# Patient Record
Sex: MALE | Race: WHITE | NOT HISPANIC OR LATINO | Employment: UNEMPLOYED | URBAN - METROPOLITAN AREA
[De-identification: names, ages, dates, MRNs, and addresses within clinical notes are randomized per-mention and may not be internally consistent; named-entity substitution may affect disease eponyms.]

---

## 2017-01-13 ENCOUNTER — OFFICE VISIT (OUTPATIENT)
Dept: NEUROSURGERY | Facility: CLINIC | Age: 47
End: 2017-01-13

## 2017-01-13 VITALS
HEART RATE: 91 BPM | DIASTOLIC BLOOD PRESSURE: 58 MMHG | SYSTOLIC BLOOD PRESSURE: 90 MMHG | HEIGHT: 70 IN | OXYGEN SATURATION: 98 % | TEMPERATURE: 98.7 F | WEIGHT: 147 LBS | BODY MASS INDEX: 21.05 KG/M2

## 2017-01-13 DIAGNOSIS — G89.29 CHRONIC NECK PAIN: ICD-10-CM

## 2017-01-13 DIAGNOSIS — M54.2 CHRONIC NECK PAIN: ICD-10-CM

## 2017-01-13 DIAGNOSIS — G89.29 CHRONIC MIDLINE LOW BACK PAIN WITHOUT SCIATICA: ICD-10-CM

## 2017-01-13 DIAGNOSIS — M47.812 CERVICAL SPONDYLOSIS WITHOUT MYELOPATHY: Primary | ICD-10-CM

## 2017-01-13 DIAGNOSIS — M54.50 CHRONIC MIDLINE LOW BACK PAIN WITHOUT SCIATICA: ICD-10-CM

## 2017-01-13 DIAGNOSIS — Z98.1 S/P CERVICAL SPINAL FUSION: ICD-10-CM

## 2017-01-13 PROCEDURE — 99213 OFFICE O/P EST LOW 20 MIN: CPT | Performed by: NEUROLOGICAL SURGERY

## 2017-01-13 RX ORDER — OXYCODONE HYDROCHLORIDE 20 MG/1
TABLET ORAL
COMMUNITY
Start: 2017-01-10 | End: 2022-03-16

## 2017-01-13 RX ORDER — DOCUSATE SODIUM -SENNOSIDES 50; 8.6 MG/1; MG/1
TABLET, COATED ORAL
COMMUNITY
Start: 2017-01-03 | End: 2022-03-16

## 2017-01-13 RX ORDER — METHOCARBAMOL 750 MG/1
TABLET, FILM COATED ORAL
COMMUNITY
Start: 2017-01-12 | End: 2022-03-16

## 2017-01-13 NOTE — PROGRESS NOTES
Patient: aVdim Grant  :  1970  Chart #:  [id number]    Date of Service: 17    Chief Complaint:   Chief Complaint   Patient presents with   • Neck Pain   • Back Pain       Back Pain   Chronicity: Patient returns today for a follow-up on his back pain. The current episode started more than 1 year ago (He has had low back pain for over 10 years now.). The problem occurs constantly. The problem is unchanged. The pain is present in the lumbar spine. Quality: They removed his pain pump 16 because of MRSA. Radiates to: He has pain that radiates down his left lower extremity to the knee. The pain is at a severity of 4/10. The pain is mild. The pain is worse during the day. The symptoms are aggravated by position. Stiffness is present: He has a pic-line placed to take his anti-biotics at home.  Risk factors include sedentary lifestyle (He has been in the hospital twice with MRSA.  On his second admission, they removed the pain pump.). He has tried bed rest and home exercises (He has been going to PT and OT.  Dr. Mishra at [facility] is the physical who put in the pain pump.) for the symptoms. The treatment provided moderate relief.     He had an intrathecal pain pump placed and it got infected with MRSA.  The pump was removed and he is on IV antibiotics;  He continues neck and low back pain;  He is walking better using a walker for balance.      Radiographic Images:   X-rays of the cervical spine dated 16 shows no offset of the vertebra.  The discs above the fusion looks good.  There is no instability;  Mild arthritic changes;  He has had C5C6 and  C6C7 ACDFs with apparent solid fusion.  X-rays of the lumbar spine shows a broken R iliac screw and L fusion cj fracture above the L5 pedicle screw.  Alignment is normal.    Past Medical History   Diagnosis Date   • Arthritis    • Hypertension    • Pneumonia      Current Outpatient Prescriptions   Medication Sig Dispense Refill   • aspirin 81 MG  EC tablet Take 81 mg by mouth daily.     • doxepin (SINEquan) 25 MG capsule Take 25 mg by mouth every night.     • famotidine (PEPCID) 20 MG tablet Take 20 mg by mouth at night as needed for heartburn.     • losartan (COZAAR) 50 MG tablet Take 100 mg by mouth daily.     • methocarbamol (ROBAXIN) 750 MG tablet      • Multiple Vitamin (MULTI-DAY VITAMINS) tablet Take  by mouth.     • naproxen sodium (ALEVE) 220 MG tablet Take 220 mg by mouth 2 (two) times a day as needed for mild pain (1-3).     • oxyCODONE (ROXICODONE) 20 MG tablet      • oxyCODONE-acetaminophen (PERCOCET) 5-325 MG per tablet Take 1 tablet by mouth every 8 (eight) hours as needed.     • SENEXON-S 8.6-50 MG per tablet        No current facility-administered medications for this visit.      Social History     Social History   • Marital status: Single     Spouse name: N/A   • Number of children: N/A   • Years of education: N/A     Social History Main Topics   • Smoking status: Current Every Day Smoker     Packs/day: 1.00     Types: Cigarettes   • Smokeless tobacco: None   • Alcohol use Yes      Comment: Moderate   • Drug use: No   • Sexual activity: Defer     Other Topics Concern   • None     Social History Narrative     Family History   Problem Relation Age of Onset   • Heart disease Father    • Cancer Brother      Past Surgical History   Procedure Laterality Date   • Neck surgery  2011   • Thoracic fusion  10/21/2014     T-10-S1 fusion by Dr Rothman   • Neck surgery  2012     [facility]   • Back surgery  10/21/2014     Dr. Rothman     Review of Systems   Constitutional: Positive for activity change, appetite change, fatigue and unexpected weight change.   Gastrointestinal: Positive for constipation.   Endocrine: Positive for polyphagia.   Musculoskeletal: Positive for back pain, gait problem and myalgias.   All other systems reviewed and are negative.    Vitals:    01/13/17 1100   BP: 90/58   Pulse: 91   Temp: 98.7 °F (37.1 °C)   SpO2: 98%      Physical Exam  Neurologic Exam  Constitutional: He is oriented to person, place, and time. Vital signs are normal. He appears well-developed and well-nourished. No distress.   Neat healthy male   Neck: Trachea normal and normal range of motion. No thyroid mass present.   Healed anterior neck incision; No Spurling's or L'Hermittes signs.   Musculoskeletal:   Lumbar back: He exhibits decreased range of motion and pain. He exhibits no deformity and no spasm.   Moderate stiffness. Healed lumbar incision.  Flexed at waist but can straighten to upright;  Using walker      Mental Status   Oriented to person, place, and time.   Attention: normal. Concentration: normal.   Speech: speech is normal   Level of consciousness: alert  Knowledge: good and consistent with education.   Normal comprehension.      Cranial Nerves   Cranial nerves II through XII intact.      Motor Exam   Muscle bulk: normal  Overall muscle tone: normal     Strength   Strength 5/5 throughout.      Sensory Exam   Light touch normal.   Proprioception normal.      Gait, Coordination, and Reflexes      Gait  Gait: (flexed at waist with cane)     Tremor   Resting tremor: absent  Intention tremor: absent  Action tremor: absent     Reflexes   Right biceps: 1+  Left biceps: 1+  Right triceps: 1+  Left triceps: 1+  Right patellar: 1+  Left patellar: 1+  Right achilles: 0  Left achilles: 0  Right Shahid: absent  Left Shahid: absent  Right ankle clonus: absent  Left ankle clonus: absent  CASTRO normal      Vadim was seen today for neck pain and back pain.    Diagnoses and all orders for this visit:    Cervical spondylosis without myelopathy    Chronic neck pain    S/P cervical spinal fusion    Chronic midline low back pain without sciatica        Plan: complete antibiotic treatment then f/u here in 6 weeks;  I don not recommend any surgery at this time.  I will make further recommendations at f/u.    Luis Fernando Rothman MD

## 2017-01-13 NOTE — MR AVS SNAPSHOT
Vadim Grant   1/13/2017 11:00 AM   Office Visit    Dept Phone:  [phone]   Encounter #:  [id number]    Provider:  Luis Fernando Rothman MD   Department:  [facility]                Your Full Care Plan              Your Updated Medication List          This list is accurate as of: 1/13/17 11:23 AM.  Always use your most recent med list.                aspirin 81 MG EC tablet       doxepin 25 MG capsule   Commonly known as:  SINEquan       famotidine 20 MG tablet   Commonly known as:  PEPCID       losartan 50 MG tablet   Commonly known as:  COZAAR       methocarbamol 750 MG tablet   Commonly known as:  ROBAXIN       MULTI-DAY VITAMINS tablet       naproxen sodium 220 MG tablet   Commonly known as:  ALEVE       oxyCODONE 20 MG tablet   Commonly known as:  ROXICODONE       oxyCODONE-acetaminophen 5-325 MG per tablet   Commonly known as:  PERCOCET       SENEXON-S 8.6-50 MG per tablet   Generic drug:  senna-docusate               You Were Diagnosed With        Codes Comments    Cervical spondylosis without myelopathy    -  Primary ICD-10-CM: M47.812  ICD-9-CM: 721.0     Chronic neck pain     ICD-10-CM: M54.2, G89.29  ICD-9-CM: 723.1, 338.29     S/P cervical spinal fusion     ICD-10-CM: Z98.1  ICD-9-CM: V45.4       Instructions     None    Patient Instructions History      Upcoming Appointments     Visit Type Date Time Department    OFFICE VISIT 1/13/2017 11:00 AM [facility]    OFFICE VISIT 2/24/2017 10:20 AM [facility]      MyChart Signup     Our records indicate that you have declined [facility] MyChart signup. If you would like to sign up for MyChart, please email [email or web address] or call [phone] to obtain an activation code.             Other Info from Your Visit           Your Appointments     Feb 24, 2017 10:20 AM EST   Office Visit with Luis Fernando Rothman MD   [facility]  "(--)    [address],  [address] [address] [phone]           Arrive 15 minutes prior to appointment.              Allergies     Neurontin [Gabapentin]        Reason for Visit     Neck Pain     Back Pain           Vital Signs     Blood Pressure Pulse Temperature Height Weight Oxygen Saturation    90/58 (BP Location: Right arm, Patient Position: Sitting) 91 98.7 °F (37.1 °C) (Temporal Artery ) 70\" (177.8 cm) 147 lb (66.7 kg) 98%    Body Mass Index Smoking Status                21.09 kg/m2 Current Every Day Smoker          Problems and Diagnoses Noted     Degenerative arthritis of cervical spine    -  Primary    Chronic neck pain        Status post cervical spinal arthrodesis            "

## 2017-01-13 NOTE — LETTER
2017     Delmy Casas MD  [address]  [address]  [facility] [address]    Patient: Vadim Grant   YOB: 1970   Date of Visit: 2017       Dear Dr. Augusto MD:    Vadim Grant was in my office today. Below is a copy of my note.    If you have questions, please do not hesitate to call me. I look forward to following Vadim along with you.         Sincerely,        Luis Fernando Rothman MD        CC: No Recipients    Patient: Vadim Grant  :  1970  Chart #:  [id number]    Date of Service: 17    Chief Complaint:   Chief Complaint   Patient presents with   • Neck Pain   • Back Pain       Back Pain   Chronicity: Patient returns today for a follow-up on his back pain. The current episode started more than 1 year ago (He has had low back pain for over 10 years now.). The problem occurs constantly. The problem is unchanged. The pain is present in the lumbar spine. Quality: They removed his pain pump 16 because of MRSA. Radiates to: He has pain that radiates down his left lower extremity to the knee. The pain is at a severity of 4/10. The pain is mild. The pain is worse during the day. The symptoms are aggravated by position. Stiffness is present: He has a pic-line placed to take his anti-biotics at home.  Risk factors include sedentary lifestyle (He has been in the hospital twice with MRSA.  On his second admission, they removed the pain pump.). He has tried bed rest and home exercises (He has been going [facility] PT [facility] OT[facility] for the symptoms. The treatment provided moderate relief.     He had an intrathecal pain pump placed and it got infected with MRSA.  The pump was removed and he is on IV antibiotics;  He continues neck and low back pain;  He is walking better using a walker for balance.      Radiographic Images:   X-rays of the cervical spine dated 16 shows no offset of the vertebra.  The discs  above the fusion looks good.  There is no instability;  Mild arthritic changes;  He has had C5C6 and  C6C7 ACDFs with apparent solid fusion.  X-rays of the lumbar spine shows a broken R iliac screw and L fusion cj fracture above the L5 pedicle screw.  Alignment is normal.    Past Medical History   Diagnosis Date   • Arthritis    • Hypertension    • Pneumonia      Current Outpatient Prescriptions   Medication Sig Dispense Refill   • aspirin 81 MG EC tablet Take 81 mg by mouth daily.     • doxepin (SINEquan) 25 MG capsule Take 25 mg by mouth every night.     • famotidine (PEPCID) 20 MG tablet Take 20 mg by mouth at night as needed for heartburn.     • losartan (COZAAR) 50 MG tablet Take 100 mg by mouth daily.     • methocarbamol (ROBAXIN) 750 MG tablet      • Multiple Vitamin (MULTI-DAY VITAMINS) tablet Take  by mouth.     • naproxen sodium (ALEVE) 220 MG tablet Take 220 mg by mouth 2 (two) times a day as needed for mild pain (1-3).     • oxyCODONE (ROXICODONE) 20 MG tablet      • oxyCODONE-acetaminophen (PERCOCET) 5-325 MG per tablet Take 1 tablet by mouth every 8 (eight) hours as needed.     • SENEXON-S 8.6-50 MG per tablet        No current facility-administered medications for this visit.      Social History     Social History   • Marital status: Single     Spouse name: N/A   • Number of children: N/A   • Years of education: N/A     Social History Main Topics   • Smoking status: Current Every Day Smoker     Packs/day: 1.00     Types: Cigarettes   • Smokeless tobacco: None   • Alcohol use Yes      Comment: Moderate   • Drug use: No   • Sexual activity: Defer     Other Topics Concern   • None     Social History Narrative     Family History   Problem Relation Age of Onset   • Heart disease Father    • Cancer Brother      Past Surgical History   Procedure Laterality Date   • Neck surgery  2011   • Thoracic fusion  10/21/2014     T-10-S1 fusion by Dr Rothman   • Neck surgery  2012     [facility] Corder[facility]   • Back surgery   10/21/2014     Dr. Rothman     Review of Systems   Constitutional: Positive for activity change, appetite change, fatigue and unexpected weight change.   Gastrointestinal: Positive for constipation.   Endocrine: Positive for polyphagia.   Musculoskeletal: Positive for back pain, gait problem and myalgias.   All other systems reviewed and are negative.    Vitals:    01/13/17 1100   BP: 90/58   Pulse: 91   Temp: 98.7 °F (37.1 °C)   SpO2: 98%     Physical Exam  Neurologic Exam  Constitutional: He is oriented to person, place, and time. Vital signs are normal. He appears well-developed and well-nourished. No distress.   Neat healthy male   Neck: Trachea normal and normal range of motion. No thyroid mass present.   Healed anterior neck incision; No Spurling's or L'Hermittes signs.   Musculoskeletal:   Lumbar back: He exhibits decreased range of motion and pain. He exhibits no deformity and no spasm.   Moderate stiffness. Healed lumbar incision.  Flexed at waist but can straighten to upright;  Using walker      Mental Status   Oriented to person, place, and time.   Attention: normal. Concentration: normal.   Speech: speech is normal   Level of consciousness: alert  Knowledge: good and consistent with education.   Normal comprehension.      Cranial Nerves   Cranial nerves II through XII intact.      Motor Exam   Muscle bulk: normal  Overall muscle tone: normal     Strength   Strength 5/5 throughout.      Sensory Exam   Light touch normal.   Proprioception normal.      Gait, Coordination, and Reflexes      Gait  Gait: (flexed at waist with cane)     Tremor   Resting tremor: absent  Intention tremor: absent  Action tremor: absent     Reflexes   Right biceps: 1+  Left biceps: 1+  Right triceps: 1+  Left triceps: 1+  Right patellar: 1+  Left patellar: 1+  Right achilles: 0  Left achilles: 0  Right Shahid: absent  Left Shahid: absent  Right ankle clonus: absent  Left ankle clonus: absent  CASTRO normal      Vadim was seen today  for neck pain and back pain.    Diagnoses and all orders for this visit:    Cervical spondylosis without myelopathy    Chronic neck pain    S/P cervical spinal fusion    Chronic midline low back pain without sciatica        Plan: complete antibiotic treatment then f/u here in 6 weeks;  I don not recommend any surgery at this time.  I will make further recommendations at f/u.    Luis Fernando Rothman MD

## 2017-02-24 ENCOUNTER — OFFICE VISIT (OUTPATIENT)
Dept: NEUROSURGERY | Facility: CLINIC | Age: 47
End: 2017-02-24

## 2017-02-24 VITALS
SYSTOLIC BLOOD PRESSURE: 116 MMHG | HEIGHT: 69 IN | HEART RATE: 105 BPM | TEMPERATURE: 96.3 F | BODY MASS INDEX: 22.36 KG/M2 | WEIGHT: 151 LBS | DIASTOLIC BLOOD PRESSURE: 84 MMHG

## 2017-02-24 DIAGNOSIS — M54.2 CHRONIC NECK PAIN: ICD-10-CM

## 2017-02-24 DIAGNOSIS — M54.50 CHRONIC MIDLINE LOW BACK PAIN WITHOUT SCIATICA: ICD-10-CM

## 2017-02-24 DIAGNOSIS — G89.29 CHRONIC NECK PAIN: ICD-10-CM

## 2017-02-24 DIAGNOSIS — M47.812 CERVICAL SPONDYLOSIS WITHOUT MYELOPATHY: Primary | ICD-10-CM

## 2017-02-24 DIAGNOSIS — Z98.1 S/P CERVICAL SPINAL FUSION: ICD-10-CM

## 2017-02-24 DIAGNOSIS — G89.29 CHRONIC MIDLINE LOW BACK PAIN WITHOUT SCIATICA: ICD-10-CM

## 2017-02-24 PROCEDURE — 99213 OFFICE O/P EST LOW 20 MIN: CPT | Performed by: NEUROLOGICAL SURGERY

## 2017-02-24 NOTE — PROGRESS NOTES
Patient: Vadim Grant  :  1970  Chart #:  [id number]    Date of Service: 17    Chief Complaint:   Chief Complaint   Patient presents with   • Back Pain       Back Pain   This is a chronic (Patient returns today for a follow up on his neck and back pain.) problem. The current episode started more than 1 year ago (He had a L3-L4 L4_L5 Esqiuvel-Petersens osteotomy and a right L2-L3 transforaminal interbody fusion with capstone cage on 10-21-14.). The problem occurs constantly. The problem has been gradually improving (He states that he has had a spasm in his back for a while.) since onset. The pain is present in the lumbar spine. The quality of the pain is described as aching. Radiates to: He has a broken screw at the bottom of his cage. The pain is at a severity of 7/10. The pain is moderate. The pain is the same all the time. Exacerbated by: He states that no matter what he does he hurts.  Stiffness is present all day. Risk factors include sedentary lifestyle and lack of exercise (He states he has passed out before beause the pain is so great.  Patient does have a history of MRSA.). He has tried heat, ice, NSAIDs and bed rest (He is seeing a infectious disease Doctor soon in [place].) for the symptoms. The treatment provided mild relief.     He has completed antibiotic (Cubicin) treatment and is to see an ID physician soon;  His back pain is better;  There are no other new symptoms or complaints since visit on 17.    Radiographic Images:   X-rays of the lumbar spine shows a broken R iliac screw and L fusion cj fracture above the L5 pedicle screw. Alignment is normal.    Past Medical History   Diagnosis Date   • Arthritis    • Hypertension    • Pneumonia      Current Outpatient Prescriptions   Medication Sig Dispense Refill   • methocarbamol (ROBAXIN) 750 MG tablet      • Multiple Vitamin (MULTI-DAY VITAMINS) tablet Take  by mouth.     • naproxen sodium (ALEVE) 220 MG tablet Take 220 mg by  "mouth 2 (two) times a day as needed for mild pain (1-3).     • oxyCODONE (ROXICODONE) 20 MG tablet      • SENEXON-S 8.6-50 MG per tablet      • aspirin 81 MG EC tablet Take 81 mg by mouth daily.     • doxepin (SINEquan) 25 MG capsule Take 25 mg by mouth every night.     • famotidine (PEPCID) 20 MG tablet Take 20 mg by mouth at night as needed for heartburn.     • losartan (COZAAR) 50 MG tablet Take 100 mg by mouth daily.     • oxyCODONE-acetaminophen (PERCOCET) 5-325 MG per tablet Take 1 tablet by mouth every 8 (eight) hours as needed.       No current facility-administered medications for this visit.       Allergies   Allergen Reactions   • Meloxicam    • Neurontin [Gabapentin]      Social History     Social History   • Marital status: Single     Spouse name: N/A   • Number of children: N/A   • Years of education: N/A     Social History Main Topics   • Smoking status: Current Every Day Smoker     Packs/day: 1.00     Types: Cigarettes   • Smokeless tobacco: None   • Alcohol use Yes      Comment: Moderate   • Drug use: No   • Sexual activity: Defer     Other Topics Concern   • None     Social History Narrative     Family History   Problem Relation Age of Onset   • Heart disease Father    • Cancer Brother      Past Surgical History   Procedure Laterality Date   • Neck surgery  2011   • Thoracic fusion  10/21/2014     T-10-S1 fusion by Dr Rothman   • Neck surgery  2012     [facility]   • Back surgery  10/21/2014     Dr. Rothman     Review of Systems   Musculoskeletal: Positive for back pain.   All other systems reviewed and are negative.    Vitals:    02/24/17 1000   BP: 116/84   Pulse: 105   Temp: 96.3 °F (35.7 °C)   Weight: 151 lb (68.5 kg)   Height: 69\" (175.3 cm)     Physical Exam  Neurologic Exam  Constitutional: He is oriented to person, place, and time. Vital signs are normal. He appears well-developed and well-nourished. No distress.   Neat healthy male   Neck: Trachea normal and normal range of motion. No " thyroid mass present.   Healed anterior neck incision; No Spurling's or L'Hermittes signs.   Musculoskeletal:   Lumbar back: He exhibits decreased range of motion and pain. He exhibits no deformity and no spasm.   Moderate stiffness. Healed lumbar incision. Flexed at waist but can straighten to upright;   R flank incision healed;  No sign of infection.    Mental Status   Oriented to person, place, and time.   Attention: normal. Concentration: normal.   Speech: speech is normal   Level of consciousness: alert  Knowledge: good and consistent with education.   Normal comprehension.       Cranial Nerves   Cranial nerves II through XII intact.       Motor Exam   Muscle bulk: normal  Overall muscle tone: normal      Strength   Strength 5/5 throughout.       Sensory Exam   Light touch normal.   Proprioception normal.       Gait, Coordination, and Reflexes       Gait  Gait: (flexed at waist with cane)      Tremor   Resting tremor: absent  Intention tremor: absent  Action tremor: absent      Reflexes   Right biceps: 1+  Left biceps: 1+  Right triceps: 1+  Left triceps: 1+  Right patellar: 1+  Left patellar: 1+  Right achilles: 0  Left achilles: 0  Right Shahid: absent  Left Shahid: absent  Right ankle clonus: absent  Left ankle clonus: absent  CASTRO normal       Vadim was seen today for back pain.    Diagnoses and all orders for this visit:    Cervical spondylosis without myelopathy    Chronic neck pain    S/P cervical spinal fusion    Chronic midline low back pain without sciatica      Plan: return in 2 months for re-evaluation;  F/u with ID;  I don't recommend any surgery at this time.      I, Dr. Luis Fernando Rothman, personally performed the services described in the documentation as scribed in my presence, and the documentation is both accurate and complete.    Luis Fernando Rothman MD   Scribed for Luis Fernando Rothman MD by ISSA Meredith, CATHLEEN @  2/24/2017  10:34 AM

## 2017-03-17 ENCOUNTER — TELEPHONE (OUTPATIENT)
Dept: NEUROSURGERY | Facility: CLINIC | Age: 47
End: 2017-03-17

## 2017-03-17 NOTE — TELEPHONE ENCOUNTER
Patient's PCP calling to update Dr. Rothman on patient status. Several weeks ago she referred him to infectious disease at [facility] in [place].  She is going to fax over hospital records.  Patient has been discharged and was seen in her office yesterday.     We will look for records and update Dr. Rothman

## 2017-03-17 NOTE — TELEPHONE ENCOUNTER
Provider:  Stephan  Caller: Jun from [facility] - Dr. Delmy Casas   Time of call:   11:46  Phone #:  [phone]  Last visit:   02/24/17      Reason for call:       ----- Message from Liberty Mabry sent at 3/17/2017 11:46 AM EDT -----  Regarding: TREATMENT WITH INF DIS FOR MRSA/STEPHAN  Contact: [phone]    Jun from [facility] - Dr. Delmy Casas (above number) needs to get some information in regards to treatment with Infectious Disease for MRSA in patient's back.    [place]

## 2017-05-22 ENCOUNTER — OFFICE VISIT (OUTPATIENT)
Dept: NEUROSURGERY | Facility: CLINIC | Age: 47
End: 2017-05-22

## 2017-05-22 VITALS
DIASTOLIC BLOOD PRESSURE: 76 MMHG | HEIGHT: 69 IN | BODY MASS INDEX: 23.11 KG/M2 | HEART RATE: 95 BPM | SYSTOLIC BLOOD PRESSURE: 122 MMHG | WEIGHT: 156 LBS | TEMPERATURE: 98.7 F | OXYGEN SATURATION: 97 %

## 2017-05-22 DIAGNOSIS — Z98.1 S/P CERVICAL SPINAL FUSION: ICD-10-CM

## 2017-05-22 DIAGNOSIS — M54.2 CHRONIC NECK PAIN: ICD-10-CM

## 2017-05-22 DIAGNOSIS — M54.50 CHRONIC MIDLINE LOW BACK PAIN WITHOUT SCIATICA: ICD-10-CM

## 2017-05-22 DIAGNOSIS — M47.812 CERVICAL SPONDYLOSIS WITHOUT MYELOPATHY: Primary | ICD-10-CM

## 2017-05-22 DIAGNOSIS — G89.29 CHRONIC NECK PAIN: ICD-10-CM

## 2017-05-22 DIAGNOSIS — G89.29 CHRONIC MIDLINE THORACIC BACK PAIN: ICD-10-CM

## 2017-05-22 DIAGNOSIS — M54.6 CHRONIC MIDLINE THORACIC BACK PAIN: ICD-10-CM

## 2017-05-22 DIAGNOSIS — G89.29 CHRONIC MIDLINE LOW BACK PAIN WITHOUT SCIATICA: ICD-10-CM

## 2017-05-22 DIAGNOSIS — M43.25 FUSION OF SPINE, THORACOLUMBAR REGION: ICD-10-CM

## 2017-05-22 PROCEDURE — 99213 OFFICE O/P EST LOW 20 MIN: CPT | Performed by: NEUROLOGICAL SURGERY

## 2017-05-22 RX ORDER — RIFAMPIN 300 MG/1
300 CAPSULE ORAL
COMMUNITY
Start: 2017-05-12 | End: 2017-05-26

## 2017-05-22 RX ORDER — DOXYCYCLINE 100 MG/1
CAPSULE ORAL
COMMUNITY
Start: 2017-05-12

## 2017-05-31 ENCOUNTER — HOSPITAL ENCOUNTER (OUTPATIENT)
Dept: MRI IMAGING | Facility: HOSPITAL | Age: 47
Discharge: HOME | End: 2017-05-31
Attending: NEUROLOGICAL SURGERY | Admitting: NEUROLOGICAL SURGERY

## 2017-05-31 ENCOUNTER — HOSPITAL ENCOUNTER (OUTPATIENT)
Dept: MRI IMAGING | Facility: HOSPITAL | Age: 47
Discharge: HOME | End: 2017-05-31
Attending: NEUROLOGICAL SURGERY

## 2017-05-31 DIAGNOSIS — M54.50 CHRONIC MIDLINE LOW BACK PAIN WITHOUT SCIATICA: ICD-10-CM

## 2017-05-31 DIAGNOSIS — G89.29 CHRONIC MIDLINE LOW BACK PAIN WITHOUT SCIATICA: ICD-10-CM

## 2017-05-31 DIAGNOSIS — G89.29 CHRONIC MIDLINE THORACIC BACK PAIN: ICD-10-CM

## 2017-05-31 DIAGNOSIS — M43.25 FUSION OF SPINE, THORACOLUMBAR REGION: ICD-10-CM

## 2017-05-31 DIAGNOSIS — M47.812 CERVICAL SPONDYLOSIS WITHOUT MYELOPATHY: ICD-10-CM

## 2017-05-31 DIAGNOSIS — M54.2 CHRONIC NECK PAIN: ICD-10-CM

## 2017-05-31 DIAGNOSIS — Z98.1 S/P CERVICAL SPINAL FUSION: ICD-10-CM

## 2017-05-31 DIAGNOSIS — G89.29 CHRONIC NECK PAIN: ICD-10-CM

## 2017-05-31 DIAGNOSIS — M54.6 CHRONIC MIDLINE THORACIC BACK PAIN: ICD-10-CM

## 2017-05-31 PROCEDURE — 72148 MRI LUMBAR SPINE W/O DYE: CPT

## 2017-05-31 PROCEDURE — 72146 MRI CHEST SPINE W/O DYE: CPT

## 2017-06-12 ENCOUNTER — OFFICE VISIT (OUTPATIENT)
Dept: NEUROSURGERY | Facility: CLINIC | Age: 47
End: 2017-06-12

## 2017-06-12 VITALS
DIASTOLIC BLOOD PRESSURE: 84 MMHG | WEIGHT: 157 LBS | SYSTOLIC BLOOD PRESSURE: 114 MMHG | HEART RATE: 80 BPM | HEIGHT: 69 IN | TEMPERATURE: 98.1 F | BODY MASS INDEX: 23.25 KG/M2

## 2017-06-12 DIAGNOSIS — M54.2 CHRONIC NECK PAIN: ICD-10-CM

## 2017-06-12 DIAGNOSIS — M54.50 CHRONIC MIDLINE LOW BACK PAIN WITHOUT SCIATICA: ICD-10-CM

## 2017-06-12 DIAGNOSIS — G89.29 CHRONIC MIDLINE THORACIC BACK PAIN: ICD-10-CM

## 2017-06-12 DIAGNOSIS — M54.6 CHRONIC MIDLINE THORACIC BACK PAIN: ICD-10-CM

## 2017-06-12 DIAGNOSIS — M43.25 FUSION OF SPINE, THORACOLUMBAR REGION: ICD-10-CM

## 2017-06-12 DIAGNOSIS — G89.29 CHRONIC MIDLINE LOW BACK PAIN WITHOUT SCIATICA: ICD-10-CM

## 2017-06-12 DIAGNOSIS — G89.29 CHRONIC NECK PAIN: ICD-10-CM

## 2017-06-12 DIAGNOSIS — Z98.1 S/P CERVICAL SPINAL FUSION: ICD-10-CM

## 2017-06-12 DIAGNOSIS — M47.812 CERVICAL SPONDYLOSIS WITHOUT MYELOPATHY: Primary | ICD-10-CM

## 2017-06-12 PROCEDURE — 99213 OFFICE O/P EST LOW 20 MIN: CPT | Performed by: NEUROLOGICAL SURGERY

## 2017-06-12 NOTE — PROGRESS NOTES
Patient: Vadim Grant  :  1970  Chart #:  [id number]    Date of Service: 17    Chief Complaint:   Chief Complaint   Patient presents with   • Follow-up       Back Pain   Chronicity: Patient returns today for a follow-up on his back pain. Episode onset: His pain pump was removed because of his infection. The problem occurs intermittently. The problem is unchanged (Patient states that he is not having any cervical pain at this point.). The pain is present in the lumbar spine (Patient continues to take Doxycycline by my mouth daily.). The quality of the pain is described as aching (Patient states that he is weaker now than he was before.  He states that the MRSA has been really hard on him.). Radiates to: He has pain that runs down his left lower extremity.  He states that he has back pain, especially since he was diagnosed with MRSA. The pain is at a severity of 6/10. The pain is mild (His pain is mild to moderate.). Worse during: Patient states that he has had 3 pick lines. The symptoms are aggravated by bending and position. Stiffness is present all day. Risk factors include sedentary lifestyle. He has tried bed rest, analgesics and walking for the symptoms. The treatment provided mild relief.     The patient has had anterior cervical discectomy and anterior plating in past.  He also has orthopedic instrumentation in his leg.  There are no other new symptoms or complaints since visit on 17.      Radiographic Images:   MRI of the lumbar spine dated 17 shows he has degenerative disc changes in his lumbar spine.  There is no bone destruction noted.  There is no abnormal signal from the disc spaces.  Alignment is good.  There is no subq fluid collection.    Thoracic spine MRI from 17 was reviewed:  Alignment is normal;  There is no abnormal signal from bone or disc.  There is no subq fluid collection.      Past Medical History:   Diagnosis Date   • Arthritis    • Hypertension    •  "Pneumonia      Current Outpatient Prescriptions   Medication Sig Dispense Refill   • doxycycline (MONODOX) 100 MG capsule      • methocarbamol (ROBAXIN) 750 MG tablet      • Multiple Vitamin (MULTI-DAY VITAMINS) tablet Take  by mouth.     • naproxen sodium (ALEVE) 220 MG tablet Take 220 mg by mouth 2 (two) times a day as needed for mild pain (1-3).     • oxyCODONE (ROXICODONE) 20 MG tablet      • SENEXON-S 8.6-50 MG per tablet        No current facility-administered medications for this visit.       Allergies   Allergen Reactions   • Meloxicam    • Neurontin [Gabapentin]      Social History     Social History   • Marital status: Single     Spouse name: N/A   • Number of children: N/A   • Years of education: N/A     Social History Main Topics   • Smoking status: Current Every Day Smoker     Packs/day: 1.00     Types: Cigarettes   • Smokeless tobacco: None   • Alcohol use Yes      Comment: Moderate   • Drug use: No   • Sexual activity: Defer     Other Topics Concern   • None     Social History Narrative     Family History   Problem Relation Age of Onset   • Heart disease Father    • Cancer Brother      Past Surgical History:   Procedure Laterality Date   • BACK SURGERY  10/21/2014    Dr. Rothman   • NECK SURGERY  2011   • NECK SURGERY  2012    [facility]   • THORACIC FUSION  10/21/2014    T-10-S1 fusion by Dr Rothman     Review of Systems   Musculoskeletal: Positive for arthralgias, back pain, gait problem, myalgias, neck pain and neck stiffness.   All other systems reviewed and are negative.    Vitals:    06/12/17 1532   BP: 114/84   Pulse: 80   Temp: 98.1 °F (36.7 °C)   Weight: 157 lb (71.2 kg)   Height: 69\" (175.3 cm)     Physical Exam  Neurologic Exam  Constitutional: He is oriented to person, place, and time. Vital signs are normal. He appears well-developed and well-nourished. No distress.   Neat healthy male   Neck: Trachea normal and normal range of motion. No thyroid mass present.   Healed anterior neck " incision; No Spurling's or L'Hermittes signs.   Musculoskeletal:   Lumbar back: He exhibits decreased range of motion and pain. He exhibits no deformity and no spasm.   Moderate stiffness. Healed lumbar incision. Flexed at waist but can straighten to upright;   R flank incision healed; No sign of infection.      Mental Status   Oriented to person, place, and time.   Attention: normal. Concentration: normal.   Speech: speech is normal   Level of consciousness: alert  Knowledge: good and consistent with education.   Normal comprehension.       Cranial Nerves   Cranial nerves II through XII intact.       Motor Exam   Muscle bulk: normal  Overall muscle tone: normal      Strength   Strength 5/5 throughout.       Sensory Exam   Light touch normal.   Proprioception normal.       Gait, Coordination, and Reflexes       Gait  Gait: (flexed at waist with cane)      Tremor   Resting tremor: absent  Intention tremor: absent  Action tremor: absent     Reflexes   Right biceps: 1+  Left biceps: 1+  Right triceps: 1+  Left triceps: 1+  Right patellar: 1+  Left patellar: 1+  Right achilles: 0  Left achilles: 0  Right Shahid: absent  Left Shahid: absent  Right ankle clonus: absent  Left ankle clonus: absent  CASTRO normal       Vadim was seen today for follow-up.    Diagnoses and all orders for this visit:    Cervical spondylosis without myelopathy    Chronic neck pain    S/P cervical spinal fusion    Chronic midline low back pain without sciatica    Chronic midline thoracic back pain    Fusion of spine, thoracolumbar region      Plan: I see no evidence of spinal infection.  I will call and talk to the ID specialist regarding Mr. Grant and make further plans.  I will make further recommendations for f/u after that discussion.    I, Dr. LuisF ernando Rothman, personally performed the services described in the documentation as scribed in my presence, and the documentation is both accurate and complete.    Nina Meredith MA   Scribed for  Luis Fernando Rothman MD by Nina Meredith, CATHLEEN @. 6/12/2017  4:04 PM

## 2022-01-05 ENCOUNTER — TRANSCRIBE ORDERS (OUTPATIENT)
Dept: ADMINISTRATIVE | Facility: HOSPITAL | Age: 52
End: 2022-01-05

## 2022-01-05 DIAGNOSIS — M46.20 OSTEOMYELITIS OF SPINE: Primary | ICD-10-CM

## 2022-01-20 ENCOUNTER — APPOINTMENT (OUTPATIENT)
Dept: CT IMAGING | Facility: HOSPITAL | Age: 52
End: 2022-01-20

## 2022-02-03 ENCOUNTER — HOSPITAL ENCOUNTER (OUTPATIENT)
Dept: CT IMAGING | Facility: HOSPITAL | Age: 52
Discharge: HOME | End: 2022-02-03
Admitting: INTERNAL MEDICINE

## 2022-02-03 DIAGNOSIS — M46.20 OSTEOMYELITIS OF SPINE: ICD-10-CM

## 2022-02-03 LAB — CREAT BLDA-MCNC: 0.7 MG/DL (ref 0.6–1.3)

## 2022-02-03 PROCEDURE — 74178 CT ABD&PLV WO CNTR FLWD CNTR: CPT

## 2022-02-03 PROCEDURE — 25010000002 IOPAMIDOL 61 % SOLUTION: Performed by: INTERNAL MEDICINE

## 2022-02-03 PROCEDURE — 82565 ASSAY OF CREATININE: CPT

## 2022-02-03 RX ADMIN — IOPAMIDOL 85 ML: 612 INJECTION, SOLUTION INTRAVENOUS at 09:55

## 2022-03-16 ENCOUNTER — OFFICE VISIT (OUTPATIENT)
Dept: NEUROSURGERY | Facility: CLINIC | Age: 52
End: 2022-03-16

## 2022-03-16 VITALS — WEIGHT: 187 LBS | BODY MASS INDEX: 27.7 KG/M2 | TEMPERATURE: 96.8 F | HEIGHT: 69 IN

## 2022-03-16 DIAGNOSIS — Z98.1 HISTORY OF LUMBAR FUSION: ICD-10-CM

## 2022-03-16 DIAGNOSIS — M96.1 LUMBAR POSTLAMINECTOMY SYNDROME: Primary | ICD-10-CM

## 2022-03-16 PROCEDURE — 99204 OFFICE O/P NEW MOD 45 MIN: CPT | Performed by: NEUROLOGICAL SURGERY

## 2022-03-16 RX ORDER — METOPROLOL SUCCINATE 50 MG/1
50 TABLET, EXTENDED RELEASE ORAL DAILY
COMMUNITY
Start: 2022-03-01

## 2022-03-16 RX ORDER — ATORVASTATIN CALCIUM 10 MG/1
10 TABLET, FILM COATED ORAL DAILY
COMMUNITY
Start: 2022-01-28

## 2022-03-16 RX ORDER — CYCLOBENZAPRINE HCL 10 MG
10 TABLET ORAL AS NEEDED
COMMUNITY

## 2022-03-16 RX ORDER — OXYCODONE AND ACETAMINOPHEN 10; 325 MG/1; MG/1
1 TABLET ORAL 2 TIMES DAILY
COMMUNITY

## 2022-03-16 RX ORDER — OMEPRAZOLE 20 MG/1
20 CAPSULE, DELAYED RELEASE ORAL 2 TIMES DAILY
COMMUNITY
Start: 2022-02-21

## 2022-03-16 RX ORDER — TAMSULOSIN HYDROCHLORIDE 0.4 MG/1
1 CAPSULE ORAL DAILY
COMMUNITY

## 2022-03-16 NOTE — PROGRESS NOTES
"Patient: Vadim Grant  : 1970    Primary Care Provider: Debbie Vasquez APRN    Requesting Provider: As above        History    Chief Complaint: Chronic low back and left leg pain with lower back \"knots.\"    History of Present Illness: Mr. Grant is a 51-year-old gentleman who underwent multilevel lumbar decompression on 2012 by Dr. Sparks.  2014 he subsidy underwent further decompression with fusion and stabilization from G40-[id number] by my former colleague Dr. Rothman.  He is also had a pain pump placed in  in [place].  That became infected with MRSA and had to be removed in 2016.  He has had ongoing difficulty since that time.  He complains of \"knots\" forming in his low back intermittently.  He has never had drainage or erythema.  He has been treated with multiple courses of antibiotics including oral suppression therapy.  Occasionally has fevers and chills.  At baseline he has back pain that extends into the left leg where he also has tingling and numbness.  He has seen Dr. Hilario of infectious disease.  Previous infectious disease providers have recommended hardware removal.  Other spinal surgeons have been not agreeable to doing that.    Review of Systems   Constitutional: Positive for activity change. Negative for appetite change, chills, diaphoresis, fatigue, fever and unexpected weight change.   HENT: Positive for dental problem. Negative for congestion, drooling, ear discharge, ear pain, facial swelling, hearing loss, mouth sores, nosebleeds, postnasal drip, rhinorrhea, sinus pressure, sinus pain, sneezing, sore throat, tinnitus, trouble swallowing and voice change.    Eyes: Negative for photophobia, pain, discharge, redness, itching and visual disturbance.   Respiratory: Negative for apnea, cough, choking, chest tightness, shortness of breath, wheezing and stridor.    Cardiovascular: Negative for chest pain, palpitations and leg swelling.   Gastrointestinal: Negative for " "abdominal distention, abdominal pain, anal bleeding, blood in stool, constipation, diarrhea, nausea, rectal pain and vomiting.   Endocrine: Negative for cold intolerance, heat intolerance, polydipsia, polyphagia and polyuria.   Genitourinary: Negative for decreased urine volume, difficulty urinating, dysuria, enuresis, flank pain, frequency, genital sores, hematuria, penile discharge, penile pain, penile swelling, scrotal swelling, testicular pain and urgency.   Musculoskeletal: Negative for arthralgias, back pain, gait problem, joint swelling, myalgias, neck pain and neck stiffness.   Skin: Negative for color change, pallor, rash and wound.   Allergic/Immunologic: Negative for environmental allergies, food allergies and immunocompromised state.   Neurological: Positive for weakness and numbness. Negative for dizziness, tremors, seizures, syncope, facial asymmetry, speech difficulty, light-headedness and headaches.   Hematological: Negative for adenopathy. Does not bruise/bleed easily.   Psychiatric/Behavioral: Negative for agitation, behavioral problems, confusion, decreased concentration, dysphoric mood, hallucinations, self-injury, sleep disturbance and suicidal ideas. The patient is not nervous/anxious and is not hyperactive.        The patient's past medical history, past surgical history, family history, and social history have been reviewed at length in the electronic medical record.    Physical Exam:   Temp 96.8 °F (36 °C)   Ht 175.3 cm (69\")   Wt 84.8 kg (187 lb)   BMI 27.62 kg/m²   CONSTITUTIONAL: Patient is well-nourished, pleasant and appears stated age.  MUSCULOSKELETAL:  Straight leg raising is negative.  Alexey's Sign is negative.  ROM in the low back is limited in all directions.  Tenderness in the back to palpation is not observed.  The thoracolumbar incision is noted.  There is a fullness measuring several centimeters across in the left lower back a couple of fingers leftward of midline.  This " is mildly tender.  There is no erythema or drainage.  NEUROLOGICAL:  Orientation, memory, attention span, language function, and cognition have been examined and are intact.  Strength is intact in the lower extremities to direct testing.  Muscle tone is normal throughout.  Station and gait are normal.  Sensation is intact to light touch testing throughout.  Deep tendon reflexes are difficult to elicit throughout.  Coordination is intact.      Medical Decision Making    Data Review:   (All imaging studies were personally reviewed unless stated otherwise)  CT of the abdomen demonstrates a small dorsal fluid collection left for at the lumbosacral level.  On 1 image he may have a fracture of the right iliac screw.    I have no laboratory results.  Results of inflammatory markers are unknown.    Diagnosis:   1.  Lumbar postlaminectomy syndrome.  2.  Possible indolent spinal infection.    Treatment Options:   I am going to check plain films as well as CTs of the thoracic and lumbar spine to assess his fusion.  If he is not fused then removal of hardware would not be prudent.  Even if he is fused removing the hardware in its entirety would be daunting.  I would like to see laboratory evidence to suggest an ongoing infection as well.    Addendum: Labs from 3/11/2022:  WBC: 10.2  CRP: 46  ESR: 24       Diagnosis Plan   1. Lumbar postlaminectomy syndrome     2. History of lumbar fusion  CT Lumbar Spine With & Without Contrast    CT Thoracic Spine With & Without Contrast       Scribed for Santiago Smith MD by MICHELLE Fuentes 3/16/2022 15:20 EDT      I, Dr. Smith, personally performed the services described in the documentation, as scribed in my presence, and it is both accurate and complete.

## 2022-03-31 ENCOUNTER — HOSPITAL ENCOUNTER (OUTPATIENT)
Dept: CT IMAGING | Facility: HOSPITAL | Age: 52
Discharge: HOME | End: 2022-03-31

## 2022-03-31 ENCOUNTER — HOSPITAL ENCOUNTER (OUTPATIENT)
Dept: GENERAL RADIOLOGY | Facility: HOSPITAL | Age: 52
Discharge: HOME | End: 2022-03-31

## 2022-03-31 DIAGNOSIS — Z98.1 HISTORY OF LUMBAR FUSION: ICD-10-CM

## 2022-03-31 LAB — CREAT BLDA-MCNC: 0.7 MG/DL (ref 0.6–1.3)

## 2022-03-31 PROCEDURE — 82565 ASSAY OF CREATININE: CPT

## 2022-03-31 PROCEDURE — 72133 CT LUMBAR SPINE W/O & W/DYE: CPT

## 2022-03-31 PROCEDURE — 72100 X-RAY EXAM L-S SPINE 2/3 VWS: CPT

## 2022-03-31 PROCEDURE — 72130 CT CHEST SPINE W/O & W/DYE: CPT

## 2022-03-31 PROCEDURE — 72070 X-RAY EXAM THORAC SPINE 2VWS: CPT

## 2022-03-31 PROCEDURE — 25010000002 IOPAMIDOL 61 % SOLUTION: Performed by: NEUROLOGICAL SURGERY

## 2022-03-31 RX ADMIN — IOPAMIDOL 100 ML: 612 INJECTION, SOLUTION INTRAVENOUS at 10:11

## 2022-04-05 ENCOUNTER — LAB (OUTPATIENT)
Dept: LAB | Facility: HOSPITAL | Age: 52
End: 2022-04-05

## 2022-04-05 ENCOUNTER — OFFICE VISIT (OUTPATIENT)
Dept: NEUROSURGERY | Facility: CLINIC | Age: 52
End: 2022-04-05

## 2022-04-05 ENCOUNTER — TRANSCRIBE ORDERS (OUTPATIENT)
Dept: LAB | Facility: HOSPITAL | Age: 52
End: 2022-04-05

## 2022-04-05 VITALS — BODY MASS INDEX: 27.7 KG/M2 | HEIGHT: 69 IN | WEIGHT: 187 LBS | TEMPERATURE: 97.1 F

## 2022-04-05 DIAGNOSIS — M46.20 SPINAL ABSCESS: ICD-10-CM

## 2022-04-05 DIAGNOSIS — L03.312 CELLULITIS OF BACK: ICD-10-CM

## 2022-04-05 DIAGNOSIS — L02.13 CARBUNCLE AND FURUNCLE OF NECK: ICD-10-CM

## 2022-04-05 DIAGNOSIS — L02.12 CARBUNCLE AND FURUNCLE OF NECK: ICD-10-CM

## 2022-04-05 DIAGNOSIS — M96.1 LUMBAR POSTLAMINECTOMY SYNDROME: Primary | ICD-10-CM

## 2022-04-05 DIAGNOSIS — Z98.1 HISTORY OF LUMBAR FUSION: ICD-10-CM

## 2022-04-05 DIAGNOSIS — A49.02 INFECTION WITH METHICILLIN-RESISTANT STAPHYLOCOCCUS AUREUS (MRSA): Primary | ICD-10-CM

## 2022-04-05 DIAGNOSIS — Z86.14 PERSONAL HISTORY OF METHICILLIN RESISTANT STAPHYLOCOCCUS AUREUS: ICD-10-CM

## 2022-04-05 LAB
ALBUMIN SERPL-MCNC: 4 G/DL (ref 3.5–5.2)
ALBUMIN/GLOB SERPL: 1 G/DL
ALP SERPL-CCNC: 119 U/L (ref 39–117)
ALT SERPL W P-5'-P-CCNC: 14 U/L (ref 1–41)
ANION GAP SERPL CALCULATED.3IONS-SCNC: 11 MMOL/L (ref 5–15)
AST SERPL-CCNC: 16 U/L (ref 1–40)
BASOPHILS # BLD AUTO: 0.09 10*3/MM3 (ref 0–0.2)
BASOPHILS NFR BLD AUTO: 0.9 % (ref 0–1.5)
BILIRUB SERPL-MCNC: 0.3 MG/DL (ref 0–1.2)
BUN SERPL-MCNC: 7 MG/DL (ref 6–20)
BUN/CREAT SERPL: 10 (ref 7–25)
CALCIUM SPEC-SCNC: 9.7 MG/DL (ref 8.6–10.5)
CHLORIDE SERPL-SCNC: 100 MMOL/L (ref 98–107)
CO2 SERPL-SCNC: 28 MMOL/L (ref 22–29)
CREAT SERPL-MCNC: 0.7 MG/DL (ref 0.76–1.27)
CRP SERPL-MCNC: 4.33 MG/DL (ref 0–0.5)
DEPRECATED RDW RBC AUTO: 43 FL (ref 37–54)
EGFRCR SERPLBLD CKD-EPI 2021: 110.2 ML/MIN/1.73
EOSINOPHIL # BLD AUTO: 0.29 10*3/MM3 (ref 0–0.4)
EOSINOPHIL NFR BLD AUTO: 2.9 % (ref 0.3–6.2)
ERYTHROCYTE [DISTWIDTH] IN BLOOD BY AUTOMATED COUNT: 13.8 % (ref 12.3–15.4)
ERYTHROCYTE [SEDIMENTATION RATE] IN BLOOD: 62 MM/HR (ref 0–20)
GLOBULIN UR ELPH-MCNC: 4 GM/DL
GLUCOSE SERPL-MCNC: 120 MG/DL (ref 65–99)
HCT VFR BLD AUTO: 41.3 % (ref 37.5–51)
HGB BLD-MCNC: 12.9 G/DL (ref 13–17.7)
IMM GRANULOCYTES # BLD AUTO: 0.04 10*3/MM3 (ref 0–0.05)
IMM GRANULOCYTES NFR BLD AUTO: 0.4 % (ref 0–0.5)
LYMPHOCYTES # BLD AUTO: 3.99 10*3/MM3 (ref 0.7–3.1)
LYMPHOCYTES NFR BLD AUTO: 39.3 % (ref 19.6–45.3)
MCH RBC QN AUTO: 26.7 PG (ref 26.6–33)
MCHC RBC AUTO-ENTMCNC: 31.2 G/DL (ref 31.5–35.7)
MCV RBC AUTO: 85.5 FL (ref 79–97)
MONOCYTES # BLD AUTO: 0.86 10*3/MM3 (ref 0.1–0.9)
MONOCYTES NFR BLD AUTO: 8.5 % (ref 5–12)
NEUTROPHILS NFR BLD AUTO: 4.87 10*3/MM3 (ref 1.7–7)
NEUTROPHILS NFR BLD AUTO: 48 % (ref 42.7–76)
NRBC BLD AUTO-RTO: 0 /100 WBC (ref 0–0.2)
PLATELET # BLD AUTO: 315 10*3/MM3 (ref 140–450)
PMV BLD AUTO: 10.6 FL (ref 6–12)
POTASSIUM SERPL-SCNC: 3.8 MMOL/L (ref 3.5–5.2)
PROT SERPL-MCNC: 8 G/DL (ref 6–8.5)
RBC # BLD AUTO: 4.83 10*6/MM3 (ref 4.14–5.8)
SODIUM SERPL-SCNC: 139 MMOL/L (ref 136–145)
WBC NRBC COR # BLD: 10.14 10*3/MM3 (ref 3.4–10.8)

## 2022-04-05 PROCEDURE — 86140 C-REACTIVE PROTEIN: CPT

## 2022-04-05 PROCEDURE — 85025 COMPLETE CBC W/AUTO DIFF WBC: CPT

## 2022-04-05 PROCEDURE — 85652 RBC SED RATE AUTOMATED: CPT

## 2022-04-05 PROCEDURE — 99213 OFFICE O/P EST LOW 20 MIN: CPT | Performed by: NEUROLOGICAL SURGERY

## 2022-04-05 PROCEDURE — 80053 COMPREHEN METABOLIC PANEL: CPT

## 2022-04-05 PROCEDURE — 36415 COLL VENOUS BLD VENIPUNCTURE: CPT

## 2022-04-05 NOTE — PROGRESS NOTES
"Patient: Vadim Grant  : 1970    Primary Care Provider: Debbie Vasquez APRN    Requesting Provider: As above        History    Chief Complaint: Low back and left leg pain with lower back \"knots.\"    History of Present Illness: Mr. Grant is a 51-year-old gentleman who underwent multilevel lumbar decompression on 2012 by Dr. Sparks.   he subsidy underwent further decompression with fusion and stabilization from [id number] by my former colleague Dr. Rothman.  He is also had a pain pump placed in  in [place].  That became infected with MRSA and had to be removed in 2016.  He has had ongoing difficulty since that time.  He complains of \"knots\" forming in his low back intermittently.  He has never had drainage or erythema.  He has been treated with multiple courses of antibiotics including oral suppression therapy.  Occasionally has fevers and chills.  At baseline he has back pain that extends into the left leg where he also has tingling and numbness.  He has seen Dr. Hilario of infectious disease.  Previous infectious disease providers have recommended hardware removal.  Other spinal surgeons have been not agreeable to doing that.    Review of Systems   Constitutional: Positive for activity change. Negative for appetite change, chills, diaphoresis, fatigue, fever and unexpected weight change.   HENT: Positive for dental problem. Negative for congestion, drooling, ear discharge, ear pain, facial swelling, hearing loss, mouth sores, nosebleeds, postnasal drip, rhinorrhea, sinus pressure, sinus pain, sneezing, sore throat, tinnitus, trouble swallowing and voice change.    Eyes: Negative for photophobia, pain, discharge, redness, itching and visual disturbance.   Respiratory: Negative for apnea, cough, choking, chest tightness, shortness of breath, wheezing and stridor.    Cardiovascular: Negative for chest pain, palpitations and leg swelling.   Gastrointestinal: Negative for abdominal " "distention, abdominal pain, anal bleeding, blood in stool, constipation, diarrhea, nausea, rectal pain and vomiting.   Endocrine: Negative for cold intolerance, heat intolerance, polydipsia, polyphagia and polyuria.   Genitourinary: Negative for decreased urine volume, difficulty urinating, dysuria, enuresis, flank pain, frequency, genital sores, hematuria, penile discharge, penile pain, penile swelling, scrotal swelling, testicular pain and urgency.   Musculoskeletal: Positive for back pain. Negative for arthralgias, gait problem, joint swelling, myalgias, neck pain and neck stiffness.   Skin: Negative for color change, pallor, rash and wound.   Allergic/Immunologic: Negative for environmental allergies, food allergies and immunocompromised state.   Neurological: Positive for weakness and numbness. Negative for dizziness, tremors, seizures, syncope, facial asymmetry, speech difficulty, light-headedness and headaches.   Hematological: Negative for adenopathy. Does not bruise/bleed easily.   Psychiatric/Behavioral: Negative for agitation, behavioral problems, confusion, decreased concentration, dysphoric mood, hallucinations, self-injury, sleep disturbance and suicidal ideas. The patient is not nervous/anxious and is not hyperactive.        The patient's past medical history, past surgical history, family history, and social history have been reviewed at length in the electronic medical record.    Physical Exam:   Temp 97.1 °F (36.2 °C)   Ht 175.3 cm (69\")   Wt 84.8 kg (187 lb)   BMI 27.62 kg/m²   MUSCULOSKELETAL:  Straight leg raising is negative.  Alexey's Sign is negative.  ROM in the low back is normal.  Tenderness in the back to palpation is not observed.  NEUROLOGICAL:  Strength is intact in the lower extremities to direct testing.  Station and gait are normal.  Sensation is intact to light touch testing throughout.      Medical Decision Making    Data Review:   (All imaging studies were personally reviewed " unless stated otherwise)  CT of the abdomen demonstrates a small dorsal fluid collection left for at the lumbosacral level.  On 1 image he may have a fracture of the right iliac screw.    Lumbar and thoracic CTs demonstrate haloing around multiple screws indicative of hardware failure.  Interbody fusion is incomplete at multiple levels in the lumbar spine.  I do not see significant posterior lateral fusion in the thoracic spine.  There may be fracture of the proximal right iliac screw.    Diagnosis:   1.  Lumbar postlaminectomy syndrome.  2.  Possible indolent spinal infection.  3.  Failure of thoracolumbar hardware construct.    Treatment Options:   There are no good solutions here.  I am going to show his films to some of my colleagues.  His hardware has already failed to some extent.  I am not sure whether removing the hardware is going to precipitate further difficulties but there is certainly some risk of the patient developing more symptoms and instability with hardware removal.       Diagnosis Plan   1. Lumbar postlaminectomy syndrome     2. History of lumbar fusion         Scribed for Santiago Smith MD by Pam Garcia [facility] 4/5/2022 11:15 EDT      I, Dr. Smith, personally performed the services described in the documentation, as scribed in my presence, and it is both accurate and complete.

## 2022-05-11 ENCOUNTER — TELEPHONE (OUTPATIENT)
Dept: NEUROSURGERY | Facility: CLINIC | Age: 52
End: 2022-05-11

## 2022-05-11 ENCOUNTER — DOCUMENTATION (OUTPATIENT)
Dept: NEUROSURGERY | Facility: CLINIC | Age: 52
End: 2022-05-11

## 2022-05-11 DIAGNOSIS — M96.1 LUMBAR POSTLAMINECTOMY SYNDROME: Primary | ICD-10-CM

## 2022-05-11 DIAGNOSIS — Z98.1 HISTORY OF LUMBAR FUSION: ICD-10-CM

## 2022-05-11 NOTE — TELEPHONE ENCOUNTER
Dr. Smith has spoken with Dr. Palmer with [facility]. Referral has been placed for patient to be evaluated by Dr. Palmer, to get his opinion on the patients situation regarding his hardware.     Please send over Dr. Saleh notes from 03/16/2022 & 04/05/2022 for Dr. Palmer's review.       I have attempted to call the patient to advise him of Dr. Smith's plan. I left voicemail for the patient to return my call.   Please let me know when he calls back.    chest pain

## 2022-05-11 NOTE — TELEPHONE ENCOUNTER
Patient returned my call. I advised patient of the referral to . Patient verbalized understanding, and had no other questions.

## 2022-05-17 ENCOUNTER — TRANSCRIBE ORDERS (OUTPATIENT)
Dept: LAB | Facility: HOSPITAL | Age: 52
End: 2022-05-17

## 2022-05-17 ENCOUNTER — LAB (OUTPATIENT)
Dept: LAB | Facility: HOSPITAL | Age: 52
End: 2022-05-17

## 2022-05-17 DIAGNOSIS — M46.20 SPINAL ABSCESS: ICD-10-CM

## 2022-05-17 DIAGNOSIS — L03.312 CELLULITIS OF BACK: ICD-10-CM

## 2022-05-17 DIAGNOSIS — A49.02 INFECTION WITH METHICILLIN-RESISTANT STAPHYLOCOCCUS AUREUS (MRSA): Primary | ICD-10-CM

## 2022-05-17 DIAGNOSIS — A49.02 INFECTION WITH METHICILLIN-RESISTANT STAPHYLOCOCCUS AUREUS (MRSA): ICD-10-CM

## 2022-05-17 LAB
ALBUMIN SERPL-MCNC: 4.5 G/DL (ref 3.5–5.2)
ALBUMIN/GLOB SERPL: 1.2 G/DL
ALP SERPL-CCNC: 141 U/L (ref 39–117)
ALT SERPL W P-5'-P-CCNC: 14 U/L (ref 1–41)
ANION GAP SERPL CALCULATED.3IONS-SCNC: 11 MMOL/L (ref 5–15)
AST SERPL-CCNC: 18 U/L (ref 1–40)
BASOPHILS # BLD AUTO: 0.09 10*3/MM3 (ref 0–0.2)
BASOPHILS NFR BLD AUTO: 1 % (ref 0–1.5)
BILIRUB SERPL-MCNC: 0.3 MG/DL (ref 0–1.2)
BUN SERPL-MCNC: 9 MG/DL (ref 6–20)
BUN/CREAT SERPL: 11.7 (ref 7–25)
CALCIUM SPEC-SCNC: 9.9 MG/DL (ref 8.6–10.5)
CHLORIDE SERPL-SCNC: 103 MMOL/L (ref 98–107)
CO2 SERPL-SCNC: 26 MMOL/L (ref 22–29)
CREAT SERPL-MCNC: 0.77 MG/DL (ref 0.76–1.27)
CRP SERPL-MCNC: 2.86 MG/DL (ref 0–0.5)
DEPRECATED RDW RBC AUTO: 45.8 FL (ref 37–54)
EGFRCR SERPLBLD CKD-EPI 2021: 107.1 ML/MIN/1.73
EOSINOPHIL # BLD AUTO: 0.24 10*3/MM3 (ref 0–0.4)
EOSINOPHIL NFR BLD AUTO: 2.6 % (ref 0.3–6.2)
ERYTHROCYTE [DISTWIDTH] IN BLOOD BY AUTOMATED COUNT: 14.6 % (ref 12.3–15.4)
ERYTHROCYTE [SEDIMENTATION RATE] IN BLOOD: 66 MM/HR (ref 0–20)
GLOBULIN UR ELPH-MCNC: 3.8 GM/DL
GLUCOSE SERPL-MCNC: 113 MG/DL (ref 65–99)
HCT VFR BLD AUTO: 43 % (ref 37.5–51)
HGB BLD-MCNC: 13.3 G/DL (ref 13–17.7)
IMM GRANULOCYTES # BLD AUTO: 0.03 10*3/MM3 (ref 0–0.05)
IMM GRANULOCYTES NFR BLD AUTO: 0.3 % (ref 0–0.5)
LYMPHOCYTES # BLD AUTO: 3.2 10*3/MM3 (ref 0.7–3.1)
LYMPHOCYTES NFR BLD AUTO: 35.3 % (ref 19.6–45.3)
MCH RBC QN AUTO: 26.4 PG (ref 26.6–33)
MCHC RBC AUTO-ENTMCNC: 30.9 G/DL (ref 31.5–35.7)
MCV RBC AUTO: 85.5 FL (ref 79–97)
MONOCYTES # BLD AUTO: 0.6 10*3/MM3 (ref 0.1–0.9)
MONOCYTES NFR BLD AUTO: 6.6 % (ref 5–12)
NEUTROPHILS NFR BLD AUTO: 4.9 10*3/MM3 (ref 1.7–7)
NEUTROPHILS NFR BLD AUTO: 54.2 % (ref 42.7–76)
NRBC BLD AUTO-RTO: 0 /100 WBC (ref 0–0.2)
PLATELET # BLD AUTO: 295 10*3/MM3 (ref 140–450)
PMV BLD AUTO: 10.4 FL (ref 6–12)
POTASSIUM SERPL-SCNC: 4.1 MMOL/L (ref 3.5–5.2)
PROT SERPL-MCNC: 8.3 G/DL (ref 6–8.5)
RBC # BLD AUTO: 5.03 10*6/MM3 (ref 4.14–5.8)
SODIUM SERPL-SCNC: 140 MMOL/L (ref 136–145)
WBC NRBC COR # BLD: 9.06 10*3/MM3 (ref 3.4–10.8)

## 2022-05-17 PROCEDURE — 85025 COMPLETE CBC W/AUTO DIFF WBC: CPT

## 2022-05-17 PROCEDURE — 80053 COMPREHEN METABOLIC PANEL: CPT

## 2022-05-17 PROCEDURE — 36415 COLL VENOUS BLD VENIPUNCTURE: CPT

## 2022-05-17 PROCEDURE — 85652 RBC SED RATE AUTOMATED: CPT

## 2022-05-17 PROCEDURE — 86140 C-REACTIVE PROTEIN: CPT

## 2022-07-07 ENCOUNTER — TRANSCRIBE ORDERS (OUTPATIENT)
Dept: LAB | Facility: HOSPITAL | Age: 52
End: 2022-07-07

## 2022-07-07 ENCOUNTER — LAB (OUTPATIENT)
Dept: LAB | Facility: HOSPITAL | Age: 52
End: 2022-07-07

## 2022-07-07 DIAGNOSIS — L02.13 CARBUNCLE AND FURUNCLE OF NECK: Primary | ICD-10-CM

## 2022-07-07 DIAGNOSIS — A49.02 MRSA INFECTION: ICD-10-CM

## 2022-07-07 DIAGNOSIS — L03.312 CELLULITIS OF BACK: ICD-10-CM

## 2022-07-07 DIAGNOSIS — M46.20 SPINAL ABSCESS: ICD-10-CM

## 2022-07-07 DIAGNOSIS — L02.12 CARBUNCLE AND FURUNCLE OF NECK: Primary | ICD-10-CM

## 2022-07-07 DIAGNOSIS — A49.02 INFECTION WITH METHICILLIN-RESISTANT STAPHYLOCOCCUS AUREUS (MRSA): ICD-10-CM

## 2022-07-07 LAB
ALBUMIN SERPL-MCNC: 4.1 G/DL (ref 3.5–5.2)
ALBUMIN/GLOB SERPL: 1 G/DL
ALP SERPL-CCNC: 135 U/L (ref 39–117)
ALT SERPL W P-5'-P-CCNC: 11 U/L (ref 1–41)
ANION GAP SERPL CALCULATED.3IONS-SCNC: 11 MMOL/L (ref 5–15)
AST SERPL-CCNC: 15 U/L (ref 1–40)
BASOPHILS # BLD AUTO: 0.07 10*3/MM3 (ref 0–0.2)
BASOPHILS NFR BLD AUTO: 0.6 % (ref 0–1.5)
BILIRUB SERPL-MCNC: 0.3 MG/DL (ref 0–1.2)
BUN SERPL-MCNC: 10 MG/DL (ref 6–20)
BUN/CREAT SERPL: 15.2 (ref 7–25)
CALCIUM SPEC-SCNC: 9.8 MG/DL (ref 8.6–10.5)
CHLORIDE SERPL-SCNC: 100 MMOL/L (ref 98–107)
CO2 SERPL-SCNC: 26 MMOL/L (ref 22–29)
CREAT SERPL-MCNC: 0.66 MG/DL (ref 0.76–1.27)
CRP SERPL-MCNC: 3.62 MG/DL (ref 0–0.5)
DEPRECATED RDW RBC AUTO: 44.3 FL (ref 37–54)
EGFRCR SERPLBLD CKD-EPI 2021: 112.2 ML/MIN/1.73
EOSINOPHIL # BLD AUTO: 0.3 10*3/MM3 (ref 0–0.4)
EOSINOPHIL NFR BLD AUTO: 2.8 % (ref 0.3–6.2)
ERYTHROCYTE [DISTWIDTH] IN BLOOD BY AUTOMATED COUNT: 14.8 % (ref 12.3–15.4)
ERYTHROCYTE [SEDIMENTATION RATE] IN BLOOD: 58 MM/HR (ref 0–20)
GLOBULIN UR ELPH-MCNC: 4 GM/DL
GLUCOSE SERPL-MCNC: 141 MG/DL (ref 65–99)
HCT VFR BLD AUTO: 39.8 % (ref 37.5–51)
HGB BLD-MCNC: 12.8 G/DL (ref 13–17.7)
IMM GRANULOCYTES # BLD AUTO: 0.04 10*3/MM3 (ref 0–0.05)
IMM GRANULOCYTES NFR BLD AUTO: 0.4 % (ref 0–0.5)
LYMPHOCYTES # BLD AUTO: 3.72 10*3/MM3 (ref 0.7–3.1)
LYMPHOCYTES NFR BLD AUTO: 34.4 % (ref 19.6–45.3)
MCH RBC QN AUTO: 26.6 PG (ref 26.6–33)
MCHC RBC AUTO-ENTMCNC: 32.2 G/DL (ref 31.5–35.7)
MCV RBC AUTO: 82.6 FL (ref 79–97)
MONOCYTES # BLD AUTO: 0.7 10*3/MM3 (ref 0.1–0.9)
MONOCYTES NFR BLD AUTO: 6.5 % (ref 5–12)
NEUTROPHILS NFR BLD AUTO: 5.97 10*3/MM3 (ref 1.7–7)
NEUTROPHILS NFR BLD AUTO: 55.3 % (ref 42.7–76)
NRBC BLD AUTO-RTO: 0 /100 WBC (ref 0–0.2)
PLATELET # BLD AUTO: 302 10*3/MM3 (ref 140–450)
PMV BLD AUTO: 10.3 FL (ref 6–12)
POTASSIUM SERPL-SCNC: 4.5 MMOL/L (ref 3.5–5.2)
PROT SERPL-MCNC: 8.1 G/DL (ref 6–8.5)
RBC # BLD AUTO: 4.82 10*6/MM3 (ref 4.14–5.8)
SODIUM SERPL-SCNC: 137 MMOL/L (ref 136–145)
WBC NRBC COR # BLD: 10.8 10*3/MM3 (ref 3.4–10.8)

## 2022-07-07 PROCEDURE — 85025 COMPLETE CBC W/AUTO DIFF WBC: CPT | Performed by: INTERNAL MEDICINE

## 2022-07-07 PROCEDURE — 85652 RBC SED RATE AUTOMATED: CPT | Performed by: INTERNAL MEDICINE

## 2022-07-07 PROCEDURE — 86140 C-REACTIVE PROTEIN: CPT | Performed by: INTERNAL MEDICINE

## 2022-07-07 PROCEDURE — 80053 COMPREHEN METABOLIC PANEL: CPT | Performed by: INTERNAL MEDICINE

## 2022-07-07 PROCEDURE — 36415 COLL VENOUS BLD VENIPUNCTURE: CPT | Performed by: INTERNAL MEDICINE

## 2022-08-16 ENCOUNTER — LAB (OUTPATIENT)
Dept: LAB | Facility: HOSPITAL | Age: 52
End: 2022-08-16

## 2022-08-16 ENCOUNTER — TRANSCRIBE ORDERS (OUTPATIENT)
Dept: LAB | Facility: HOSPITAL | Age: 52
End: 2022-08-16

## 2022-08-16 DIAGNOSIS — Z86.14 PERSONAL HISTORY OF METHICILLIN RESISTANT STAPHYLOCOCCUS AUREUS: ICD-10-CM

## 2022-08-16 DIAGNOSIS — M46.20 SPINAL ABSCESS: ICD-10-CM

## 2022-08-16 DIAGNOSIS — L03.312 CELLULITIS OF BACK: ICD-10-CM

## 2022-08-16 DIAGNOSIS — A49.02 INFECTION WITH METHICILLIN-RESISTANT STAPHYLOCOCCUS AUREUS (MRSA): Primary | ICD-10-CM

## 2022-08-16 LAB
ALBUMIN SERPL-MCNC: 4.3 G/DL (ref 3.5–5.2)
ALBUMIN/GLOB SERPL: 1 G/DL
ALP SERPL-CCNC: 134 U/L (ref 39–117)
ALT SERPL W P-5'-P-CCNC: 12 U/L (ref 1–41)
ANION GAP SERPL CALCULATED.3IONS-SCNC: 11 MMOL/L (ref 5–15)
AST SERPL-CCNC: 17 U/L (ref 1–40)
BASOPHILS # BLD AUTO: 0.08 10*3/MM3 (ref 0–0.2)
BASOPHILS NFR BLD AUTO: 0.8 % (ref 0–1.5)
BILIRUB SERPL-MCNC: 0.2 MG/DL (ref 0–1.2)
BUN SERPL-MCNC: 9 MG/DL (ref 6–20)
BUN/CREAT SERPL: 9.3 (ref 7–25)
CALCIUM SPEC-SCNC: 9.7 MG/DL (ref 8.6–10.5)
CHLORIDE SERPL-SCNC: 99 MMOL/L (ref 98–107)
CO2 SERPL-SCNC: 26 MMOL/L (ref 22–29)
CREAT SERPL-MCNC: 0.97 MG/DL (ref 0.76–1.27)
CRP SERPL-MCNC: 2.16 MG/DL (ref 0–0.5)
DEPRECATED RDW RBC AUTO: 45.1 FL (ref 37–54)
EGFRCR SERPLBLD CKD-EPI 2021: 93.3 ML/MIN/1.73
EOSINOPHIL # BLD AUTO: 0.47 10*3/MM3 (ref 0–0.4)
EOSINOPHIL NFR BLD AUTO: 4.5 % (ref 0.3–6.2)
ERYTHROCYTE [DISTWIDTH] IN BLOOD BY AUTOMATED COUNT: 14.9 % (ref 12.3–15.4)
ERYTHROCYTE [SEDIMENTATION RATE] IN BLOOD: 81 MM/HR (ref 0–20)
GLOBULIN UR ELPH-MCNC: 4.3 GM/DL
GLUCOSE SERPL-MCNC: 128 MG/DL (ref 65–99)
HCT VFR BLD AUTO: 39.6 % (ref 37.5–51)
HGB BLD-MCNC: 12.5 G/DL (ref 13–17.7)
IMM GRANULOCYTES # BLD AUTO: 0.05 10*3/MM3 (ref 0–0.05)
IMM GRANULOCYTES NFR BLD AUTO: 0.5 % (ref 0–0.5)
LYMPHOCYTES # BLD AUTO: 3.67 10*3/MM3 (ref 0.7–3.1)
LYMPHOCYTES NFR BLD AUTO: 35 % (ref 19.6–45.3)
MCH RBC QN AUTO: 26.2 PG (ref 26.6–33)
MCHC RBC AUTO-ENTMCNC: 31.6 G/DL (ref 31.5–35.7)
MCV RBC AUTO: 83 FL (ref 79–97)
MONOCYTES # BLD AUTO: 0.65 10*3/MM3 (ref 0.1–0.9)
MONOCYTES NFR BLD AUTO: 6.2 % (ref 5–12)
NEUTROPHILS NFR BLD AUTO: 5.56 10*3/MM3 (ref 1.7–7)
NEUTROPHILS NFR BLD AUTO: 53 % (ref 42.7–76)
NRBC BLD AUTO-RTO: 0 /100 WBC (ref 0–0.2)
PLATELET # BLD AUTO: 310 10*3/MM3 (ref 140–450)
PMV BLD AUTO: 10.3 FL (ref 6–12)
POTASSIUM SERPL-SCNC: 4.7 MMOL/L (ref 3.5–5.2)
PROT SERPL-MCNC: 8.6 G/DL (ref 6–8.5)
RBC # BLD AUTO: 4.77 10*6/MM3 (ref 4.14–5.8)
SODIUM SERPL-SCNC: 136 MMOL/L (ref 136–145)
WBC NRBC COR # BLD: 10.48 10*3/MM3 (ref 3.4–10.8)

## 2022-08-16 PROCEDURE — 36415 COLL VENOUS BLD VENIPUNCTURE: CPT | Performed by: INTERNAL MEDICINE

## 2022-08-16 PROCEDURE — 80053 COMPREHEN METABOLIC PANEL: CPT | Performed by: INTERNAL MEDICINE

## 2022-08-16 PROCEDURE — 86140 C-REACTIVE PROTEIN: CPT | Performed by: INTERNAL MEDICINE

## 2022-08-16 PROCEDURE — 85652 RBC SED RATE AUTOMATED: CPT | Performed by: INTERNAL MEDICINE

## 2022-08-16 PROCEDURE — 85025 COMPLETE CBC W/AUTO DIFF WBC: CPT | Performed by: INTERNAL MEDICINE

## 2023-01-10 ENCOUNTER — TRANSCRIBE ORDERS (OUTPATIENT)
Dept: LAB | Facility: HOSPITAL | Age: 53
End: 2023-01-10
Payer: MEDICARE

## 2023-01-10 ENCOUNTER — LAB (OUTPATIENT)
Dept: LAB | Facility: HOSPITAL | Age: 53
End: 2023-01-10
Payer: MEDICARE

## 2023-01-10 DIAGNOSIS — L02.212 ABSCESS OF BACK: ICD-10-CM

## 2023-01-10 DIAGNOSIS — L03.312 CELLULITIS OF BACK: Primary | ICD-10-CM

## 2023-01-10 DIAGNOSIS — A49.02 INFECTION WITH METHICILLIN-RESISTANT STAPHYLOCOCCUS AUREUS (MRSA): ICD-10-CM

## 2023-01-10 DIAGNOSIS — M46.20 VERTEBRAL OSTEOMYELITIS: ICD-10-CM

## 2023-01-10 LAB
ALBUMIN SERPL-MCNC: 4.4 G/DL (ref 3.5–5.2)
ALBUMIN/GLOB SERPL: 1.2 G/DL
ALP SERPL-CCNC: 128 U/L (ref 39–117)
ALT SERPL W P-5'-P-CCNC: 12 U/L (ref 1–41)
ANION GAP SERPL CALCULATED.3IONS-SCNC: 11 MMOL/L (ref 5–15)
AST SERPL-CCNC: 18 U/L (ref 1–40)
BASOPHILS # BLD AUTO: 0.05 10*3/MM3 (ref 0–0.2)
BASOPHILS NFR BLD AUTO: 0.5 % (ref 0–1.5)
BILIRUB SERPL-MCNC: 0.2 MG/DL (ref 0–1.2)
BUN SERPL-MCNC: 10 MG/DL (ref 6–20)
BUN/CREAT SERPL: 13 (ref 7–25)
CALCIUM SPEC-SCNC: 9.9 MG/DL (ref 8.6–10.5)
CHLORIDE SERPL-SCNC: 99 MMOL/L (ref 98–107)
CO2 SERPL-SCNC: 26 MMOL/L (ref 22–29)
CREAT SERPL-MCNC: 0.77 MG/DL (ref 0.76–1.27)
CRP SERPL-MCNC: 7.18 MG/DL (ref 0–0.5)
DEPRECATED RDW RBC AUTO: 45.1 FL (ref 37–54)
EGFRCR SERPLBLD CKD-EPI 2021: 106.4 ML/MIN/1.73
EOSINOPHIL # BLD AUTO: 0.33 10*3/MM3 (ref 0–0.4)
EOSINOPHIL NFR BLD AUTO: 3.1 % (ref 0.3–6.2)
ERYTHROCYTE [DISTWIDTH] IN BLOOD BY AUTOMATED COUNT: 14.9 % (ref 12.3–15.4)
ERYTHROCYTE [SEDIMENTATION RATE] IN BLOOD: 100 MM/HR (ref 0–20)
GLOBULIN UR ELPH-MCNC: 3.8 GM/DL
GLUCOSE SERPL-MCNC: 104 MG/DL (ref 65–99)
HCT VFR BLD AUTO: 39.5 % (ref 37.5–51)
HGB BLD-MCNC: 12.6 G/DL (ref 13–17.7)
IMM GRANULOCYTES # BLD AUTO: 0.13 10*3/MM3 (ref 0–0.05)
IMM GRANULOCYTES NFR BLD AUTO: 1.2 % (ref 0–0.5)
LYMPHOCYTES # BLD AUTO: 3.32 10*3/MM3 (ref 0.7–3.1)
LYMPHOCYTES NFR BLD AUTO: 31.6 % (ref 19.6–45.3)
MCH RBC QN AUTO: 26.5 PG (ref 26.6–33)
MCHC RBC AUTO-ENTMCNC: 31.9 G/DL (ref 31.5–35.7)
MCV RBC AUTO: 83.2 FL (ref 79–97)
MONOCYTES # BLD AUTO: 0.89 10*3/MM3 (ref 0.1–0.9)
MONOCYTES NFR BLD AUTO: 8.5 % (ref 5–12)
NEUTROPHILS NFR BLD AUTO: 5.78 10*3/MM3 (ref 1.7–7)
NEUTROPHILS NFR BLD AUTO: 55.1 % (ref 42.7–76)
NRBC BLD AUTO-RTO: 0 /100 WBC (ref 0–0.2)
PLATELET # BLD AUTO: 291 10*3/MM3 (ref 140–450)
PMV BLD AUTO: 10.4 FL (ref 6–12)
POTASSIUM SERPL-SCNC: 4.5 MMOL/L (ref 3.5–5.2)
PROT SERPL-MCNC: 8.2 G/DL (ref 6–8.5)
RBC # BLD AUTO: 4.75 10*6/MM3 (ref 4.14–5.8)
SODIUM SERPL-SCNC: 136 MMOL/L (ref 136–145)
WBC NRBC COR # BLD: 10.5 10*3/MM3 (ref 3.4–10.8)

## 2023-01-10 PROCEDURE — 85025 COMPLETE CBC W/AUTO DIFF WBC: CPT | Performed by: INTERNAL MEDICINE

## 2023-01-10 PROCEDURE — 36415 COLL VENOUS BLD VENIPUNCTURE: CPT | Performed by: INTERNAL MEDICINE

## 2023-01-10 PROCEDURE — 80053 COMPREHEN METABOLIC PANEL: CPT | Performed by: INTERNAL MEDICINE

## 2023-01-10 PROCEDURE — 86140 C-REACTIVE PROTEIN: CPT | Performed by: INTERNAL MEDICINE

## 2023-01-10 PROCEDURE — 85652 RBC SED RATE AUTOMATED: CPT | Performed by: INTERNAL MEDICINE

## 2023-07-25 ENCOUNTER — TRANSCRIBE ORDERS (OUTPATIENT)
Dept: LAB | Facility: HOSPITAL | Age: 53
End: 2023-07-25
Payer: MEDICARE

## 2023-07-25 ENCOUNTER — LAB (OUTPATIENT)
Dept: LAB | Facility: HOSPITAL | Age: 53
End: 2023-07-25
Payer: MEDICARE

## 2023-07-25 DIAGNOSIS — L03.312 CELLULITIS OF BACK: ICD-10-CM

## 2023-07-25 DIAGNOSIS — A49.02 INFECTION WITH METHICILLIN-RESISTANT STAPHYLOCOCCUS AUREUS (MRSA): Primary | ICD-10-CM

## 2023-07-25 DIAGNOSIS — A49.02 INFECTION WITH METHICILLIN-RESISTANT STAPHYLOCOCCUS AUREUS (MRSA): ICD-10-CM

## 2023-07-25 DIAGNOSIS — L02.212 ABSCESS OF BACK, EXCEPT BUTTOCK: ICD-10-CM

## 2023-07-25 DIAGNOSIS — M46.20 SPINAL ABSCESS: ICD-10-CM

## 2023-07-25 LAB
ALBUMIN SERPL-MCNC: 4.2 G/DL (ref 3.5–5.2)
ALBUMIN/GLOB SERPL: 1.1 G/DL
ALP SERPL-CCNC: 135 U/L (ref 39–117)
ALT SERPL W P-5'-P-CCNC: 17 U/L (ref 1–41)
ANION GAP SERPL CALCULATED.3IONS-SCNC: 11 MMOL/L (ref 5–15)
AST SERPL-CCNC: 20 U/L (ref 1–40)
BASOPHILS # BLD AUTO: 0.06 10*3/MM3 (ref 0–0.2)
BASOPHILS NFR BLD AUTO: 0.6 % (ref 0–1.5)
BILIRUB SERPL-MCNC: 0.2 MG/DL (ref 0–1.2)
BUN SERPL-MCNC: 7 MG/DL (ref 6–20)
BUN/CREAT SERPL: 8.6 (ref 7–25)
CALCIUM SPEC-SCNC: 9.5 MG/DL (ref 8.6–10.5)
CHLORIDE SERPL-SCNC: 99 MMOL/L (ref 98–107)
CO2 SERPL-SCNC: 27 MMOL/L (ref 22–29)
CREAT SERPL-MCNC: 0.81 MG/DL (ref 0.76–1.27)
CRP SERPL-MCNC: 3.3 MG/DL (ref 0–0.5)
DEPRECATED RDW RBC AUTO: 46 FL (ref 37–54)
EGFRCR SERPLBLD CKD-EPI 2021: 104.8 ML/MIN/1.73
EOSINOPHIL # BLD AUTO: 0.36 10*3/MM3 (ref 0–0.4)
EOSINOPHIL NFR BLD AUTO: 3.6 % (ref 0.3–6.2)
ERYTHROCYTE [DISTWIDTH] IN BLOOD BY AUTOMATED COUNT: 15.3 % (ref 12.3–15.4)
ERYTHROCYTE [SEDIMENTATION RATE] IN BLOOD: 72 MM/HR (ref 0–20)
GLOBULIN UR ELPH-MCNC: 3.9 GM/DL
GLUCOSE SERPL-MCNC: 96 MG/DL (ref 65–99)
HCT VFR BLD AUTO: 40.7 % (ref 37.5–51)
HGB BLD-MCNC: 12.4 G/DL (ref 13–17.7)
IMM GRANULOCYTES # BLD AUTO: 0.07 10*3/MM3 (ref 0–0.05)
IMM GRANULOCYTES NFR BLD AUTO: 0.7 % (ref 0–0.5)
LYMPHOCYTES # BLD AUTO: 3.78 10*3/MM3 (ref 0.7–3.1)
LYMPHOCYTES NFR BLD AUTO: 38.1 % (ref 19.6–45.3)
MCH RBC QN AUTO: 25.4 PG (ref 26.6–33)
MCHC RBC AUTO-ENTMCNC: 30.5 G/DL (ref 31.5–35.7)
MCV RBC AUTO: 83.4 FL (ref 79–97)
MONOCYTES # BLD AUTO: 0.81 10*3/MM3 (ref 0.1–0.9)
MONOCYTES NFR BLD AUTO: 8.2 % (ref 5–12)
NEUTROPHILS NFR BLD AUTO: 4.85 10*3/MM3 (ref 1.7–7)
NEUTROPHILS NFR BLD AUTO: 48.8 % (ref 42.7–76)
NRBC BLD AUTO-RTO: 0 /100 WBC (ref 0–0.2)
PLATELET # BLD AUTO: 256 10*3/MM3 (ref 140–450)
PMV BLD AUTO: 10.2 FL (ref 6–12)
POTASSIUM SERPL-SCNC: 4.6 MMOL/L (ref 3.5–5.2)
PROT SERPL-MCNC: 8.1 G/DL (ref 6–8.5)
RBC # BLD AUTO: 4.88 10*6/MM3 (ref 4.14–5.8)
SODIUM SERPL-SCNC: 137 MMOL/L (ref 136–145)
WBC NRBC COR # BLD: 9.93 10*3/MM3 (ref 3.4–10.8)

## 2023-07-25 PROCEDURE — 36415 COLL VENOUS BLD VENIPUNCTURE: CPT | Performed by: INTERNAL MEDICINE

## 2023-07-25 PROCEDURE — 80053 COMPREHEN METABOLIC PANEL: CPT | Performed by: INTERNAL MEDICINE

## 2023-07-25 PROCEDURE — 86140 C-REACTIVE PROTEIN: CPT | Performed by: INTERNAL MEDICINE

## 2023-07-25 PROCEDURE — 85652 RBC SED RATE AUTOMATED: CPT

## 2023-07-25 PROCEDURE — 85025 COMPLETE CBC W/AUTO DIFF WBC: CPT

## 2023-08-29 ENCOUNTER — OFFICE VISIT (OUTPATIENT)
Dept: ORTHOPEDIC SURGERY | Facility: CLINIC | Age: 53
End: 2023-08-29
Payer: MEDICARE

## 2023-08-29 VITALS
SYSTOLIC BLOOD PRESSURE: 120 MMHG | BODY MASS INDEX: 28.58 KG/M2 | HEIGHT: 68 IN | WEIGHT: 188.6 LBS | DIASTOLIC BLOOD PRESSURE: 78 MMHG

## 2023-08-29 DIAGNOSIS — M75.21 BICEPS TENDINITIS OF RIGHT UPPER EXTREMITY: ICD-10-CM

## 2023-08-29 DIAGNOSIS — M75.41 IMPINGEMENT SYNDROME OF RIGHT SHOULDER: ICD-10-CM

## 2023-08-29 DIAGNOSIS — S46.001A ROTATOR CUFF INJURY, RIGHT, INITIAL ENCOUNTER: Primary | ICD-10-CM

## 2023-08-29 DIAGNOSIS — M75.51 BURSITIS OF RIGHT SHOULDER: ICD-10-CM

## 2023-08-29 RX ORDER — SULFAMETHOXAZOLE AND TRIMETHOPRIM 800; 160 MG/1; MG/1
1 TABLET ORAL 2 TIMES DAILY
COMMUNITY
Start: 2022-07-07 | End: 2024-01-21

## 2023-08-29 RX ORDER — OXYCODONE HYDROCHLORIDE 10 MG/1
10 TABLET ORAL EVERY 6 HOURS PRN
COMMUNITY
Start: 2023-06-27

## 2023-08-29 NOTE — PROGRESS NOTES
"                                                                    [facility] [place] Orthopaedic Surgery Office Visit - Osvaldo Lo MD    Office Visit       Patient Name: Vadim Grant    Chief Complaint:   Chief Complaint   Patient presents with    Right Shoulder - Pain       Referring Physician: Shazia Gordon, *  - I appreciate the referral      History of Present Illness:   Vadim Grant is a 52 y.o. male who presents with right body part: shoulder Reason: pain.  Onset:Onset: atraumatic and gradual in nature. The issue has been ongoing for 20 year(s). Pain is a 7/10 on the pain scale. Pain is described as Pain Characterization: aching. Associated symptoms include Symptoms: pain and stiffness. The pain is worse with sitting, working, lying on affected side, rising from seated position, and any movement of the joint; resting, pain medication and/or NSAID, and lying down improve the pain. Previous treatments have included: NSAIDS. I have reviewed the patient's history of present illness as noted/entered above.    I have reviewed the patient's past medical history, surgical history, social history, family history, medications, and allergies as noted in the electronic medical record and as noted/entered.  I have reviewed the patient's review of systems as noted/enter and updated as noted in the patient's HPI.      RIGHT SHOULDER pain  20 years of pain right shoulder  Trouble overhead, trouble with descent, pain and weakness  [place] x15 years  [place] Xrays reviewed -- no acute bony findings  Chronic Oxycodone - prior neck and back fusion he notes  Multiple prior back and neck surgeries noted    MRSA with pain pump 2016 and subsequently removed    Dr. Jaswinder Hilario -- ID  Prior back injections \"flared up MRSA\"    Used to enjoy riding horse, squirrel hunting, raised goats and cows  Prior , fabricator and machining, broke horses, shoed    Limited over the years with multiple back surgeries and neck " surgery  Baseline diminished sensation in his right upper extremity he notes, perceived weakness    52 y.o. male  Body mass index is 28.68 kg/mý.    Subjective   Subjective      Review of Systems   Constitutional: Negative.  Negative for chills, fatigue and fever.   HENT: Negative.  Negative for congestion and dental problem.    Eyes: Negative.  Negative for blurred vision.   Respiratory: Negative.  Negative for shortness of breath.    Cardiovascular: Negative.  Negative for leg swelling.   Gastrointestinal: Negative.  Negative for abdominal pain.   Endocrine: Negative.  Negative for polyuria.   Genitourinary: Negative.  Negative for difficulty urinating.   Musculoskeletal:  Positive for arthralgias.   Skin: Negative.    Allergic/Immunologic: Negative.    Neurological: Negative.    Hematological: Negative.  Negative for adenopathy.   Psychiatric/Behavioral: Negative.  Negative for behavioral problems.       Past Medical History:   Past Medical History:   Diagnosis Date    Arthritis     Hypertension     Low back pain     Pneumonia        Past Surgical History:   Past Surgical History:   Procedure Laterality Date    BACK SURGERY  05/07/2012    L3-4 Laminectomy, L5 Laminectomy, facetectomy, & Foraminotomy - Dr. Tee Sparks    NECK SURGERY  2011    NECK SURGERY  2012    [facility]ryn Consuelo    THORACIC FUSION  10/21/2014    T10-S1 Segmental Posterolateral Fusion - Dr. Luis Fernando Rothman       Family History:   Family History   Problem Relation Age of Onset    Heart disease Father     Cancer Brother        Social History:   Social History     Socioeconomic History    Marital status: Single   Tobacco Use    Smoking status: Every Day     Packs/day: 0.50     Types: Cigarettes    Smokeless tobacco: Never   Vaping Use    Vaping Use: Never used   Substance and Sexual Activity    Alcohol use: Yes     Comment: Moderate    Drug use: No    Sexual activity: Defer       Medications:   Current Outpatient Medications:     atorvastatin  "(LIPITOR) 10 MG tablet, Take 1 tablet by mouth Daily., Disp: , Rfl:     Bactrim -160 MG per tablet, Take 1 tablet by mouth 2 (Two) Times a Day., Disp: , Rfl:     cyclobenzaprine (FLEXERIL) 10 MG tablet, Take 1 tablet by mouth As Needed., Disp: , Rfl:     metoprolol succinate XL (TOPROL-XL) 50 MG 24 hr tablet, Take 1 tablet by mouth Daily., Disp: , Rfl:     naproxen sodium (ALEVE) 220 MG tablet, Take 1 tablet by mouth 2 (Two) Times a Day As Needed for Mild Pain., Disp: , Rfl:     omeprazole (priLOSEC) 20 MG capsule, Take 1 capsule by mouth 2 (Two) Times a Day., Disp: , Rfl:     oxyCODONE (ROXICODONE) 10 MG tablet, Take 1 tablet by mouth Every 6 (Six) Hours As Needed., Disp: , Rfl:     tamsulosin (FLOMAX) 0.4 MG capsule 24 hr capsule, Take 1 capsule by mouth Daily., Disp: , Rfl:     Allergies:   Allergies   Allergen Reactions    Meloxicam     Neurontin [Gabapentin]        The following portions of the patient's history were reviewed and updated as appropriate: allergies, current medications, past family history, past medical history, past social history, past surgical history and problem list.        Objective    Objective      Vital Signs:   Vitals:    08/29/23 1014   BP: 120/78   Weight: 85.5 kg (188 lb 9.6 oz)   Height: 172.7 cm (68\")       Ortho Exam:  RIGHT SHOULDER    General: no acute distress, comfortable  Vitals reviewed in chart    Generalized numbness and tingling he reports in his right upper extremity and perceived generalized weakness    Musculoskeletal Exam    SIDE: RIGHT SHOULDER  Shoulder Exam:    Tenderness: rotator cuff    Range of motion measurements (degrees)  Forward flexion/Abduction/External rotation at side/ER at 90/IR at 90/IR position  Active: pain limited 130/130/45/80/70  Passive: pain limited    Painful arc of motion: yes  No evidence of septic joint  Pain with forward flexion and abduction greater: 90 degrees  Impingement testing Neer's test - positive/painful  Impingement testing " Hawkin's test - positive/painful    Rotator Cuff Testing:  Tenderness to palpation at rotator cuff - YES  Rotator cuff testing Estelita's test - positive  Rotator cuff testing External rotation - no  Rotator cuff testing Lag signs - no  Rotator cuff testing Belly press - no  Pain with abduction great than 90 degrees - yes    Scapular dyskinesis - present, abnormal scapular motion    Long head of the biceps testing:  Nichols's test for biceps & Speed's test - painful  Bicipital groove tenderness to palpation/tenderness to palpation of biceps tendon - painful      Results Review:   Imaging Results (Last 24 Hours)       ** No results found for the last 24 hours. **              Procedures             Assessment / Plan      Assessment/Plan:   Problem List Items Addressed This Visit          Musculoskeletal and Injuries    Bursitis of right shoulder    Relevant Orders    MRI Shoulder Right Without Contrast    Impingement syndrome of right shoulder    Relevant Orders    MRI Shoulder Right Without Contrast    Biceps tendinitis of right upper extremity    Relevant Orders    MRI Shoulder Right Without Contrast       Other    Rotator cuff injury, right, initial encounter - Primary    Relevant Orders    MRI Shoulder Right Without Contrast       RIGHT SHOULDER  MRI of the shoulder is recommended.  Indication: suspected rotator cuff tear  The MRI is critical to evaluate for rotator cuff tearing and will help with possible surgical planning.      Follow Up: AFTER RIGHT SHOULDER        Osvaldo Lo MD, FAAOS  Orthopedic Surgeon  Fellowship Trained Shoulder and Elbow Surgeon  [facility]  Orthopedics and Sports Medicine  [address], [address]  [place] [address]    08/29/23  10:48 EDT

## 2023-11-17 ENCOUNTER — HOSPITAL ENCOUNTER (OUTPATIENT)
Dept: MRI IMAGING | Facility: HOSPITAL | Age: 53
Discharge: HOME | End: 2023-11-17
Admitting: ORTHOPAEDIC SURGERY
Payer: MEDICARE

## 2023-11-17 DIAGNOSIS — S46.001A ROTATOR CUFF INJURY, RIGHT, INITIAL ENCOUNTER: ICD-10-CM

## 2023-11-17 DIAGNOSIS — M75.21 BICEPS TENDINITIS OF RIGHT UPPER EXTREMITY: ICD-10-CM

## 2023-11-17 DIAGNOSIS — M75.41 IMPINGEMENT SYNDROME OF RIGHT SHOULDER: ICD-10-CM

## 2023-11-17 DIAGNOSIS — M75.51 BURSITIS OF RIGHT SHOULDER: ICD-10-CM

## 2023-11-17 PROCEDURE — 73221 MRI JOINT UPR EXTREM W/O DYE: CPT

## 2023-11-21 ENCOUNTER — OFFICE VISIT (OUTPATIENT)
Dept: ORTHOPEDIC SURGERY | Facility: CLINIC | Age: 53
End: 2023-11-21
Payer: MEDICARE

## 2023-11-21 VITALS
SYSTOLIC BLOOD PRESSURE: 122 MMHG | BODY MASS INDEX: 25.77 KG/M2 | WEIGHT: 180 LBS | DIASTOLIC BLOOD PRESSURE: 80 MMHG | HEIGHT: 70 IN

## 2023-11-21 DIAGNOSIS — M75.111 NONTRAUMATIC INCOMPLETE TEAR OF RIGHT ROTATOR CUFF: Primary | ICD-10-CM

## 2023-11-21 DIAGNOSIS — S43.003A SUBLUXATION OF TENDON OF LONG HEAD OF BICEPS: ICD-10-CM

## 2023-11-21 DIAGNOSIS — M75.51 BURSITIS OF RIGHT SHOULDER: ICD-10-CM

## 2023-11-21 DIAGNOSIS — S46.001A ROTATOR CUFF INJURY, RIGHT, INITIAL ENCOUNTER: ICD-10-CM

## 2023-11-21 DIAGNOSIS — M75.21 BICEPS TENDINITIS OF RIGHT UPPER EXTREMITY: ICD-10-CM

## 2023-11-21 DIAGNOSIS — M75.41 IMPINGEMENT SYNDROME OF RIGHT SHOULDER: ICD-10-CM

## 2023-11-21 RX ORDER — OXYCODONE AND ACETAMINOPHEN 10; 325 MG/1; MG/1
TABLET ORAL
COMMUNITY

## 2023-11-21 RX ORDER — ASPIRIN 81 MG/1
TABLET, CHEWABLE ORAL DAILY
COMMUNITY

## 2023-11-21 RX ORDER — CLOPIDOGREL BISULFATE 75 MG/1
75 TABLET ORAL
COMMUNITY
Start: 2023-09-27

## 2023-11-21 RX ORDER — PANTOPRAZOLE SODIUM 40 MG/1
40 TABLET, DELAYED RELEASE ORAL
COMMUNITY
Start: 2023-09-27

## 2023-11-21 RX ORDER — ATORVASTATIN CALCIUM 40 MG/1
40 TABLET, FILM COATED ORAL
COMMUNITY
Start: 2023-09-27

## 2023-11-21 RX ORDER — CETIRIZINE HYDROCHLORIDE 10 MG/1
TABLET, CHEWABLE ORAL
COMMUNITY

## 2023-11-21 NOTE — PROGRESS NOTES
"                                                                [facility] [place] Follow Up       Office Follow Up Visit       Patient Name: Vadim Grant    Chief Complaint:   Chief Complaint   Patient presents with    Follow-up     3 month (MRI) follow-up: Rotator cuff injury, right       Referring Physician: No ref. provider found    History of Present Illness:   It has been 3  month(s) since Vadim Grant's last visit. Vadim Grant returns to clinic today for F/U: follow-up of rightBody Part: shoulderReason: pain. The issue has been ongoing for 20 year(s). Vadim Grant rates HIS/HER: hispain at 6/10 on the pain scale. Previous/current treatments: nothing. Current symptoms:Symptoms: pain and stiffness. The pain is worse with working, rising from seated position, and any movement of the joint; resting and pain medication and/or NSAID improves the pain. Overall, he/she: heis doing worse.  I have reviewed the patient's history of present illness as noted/entered above.    I have reviewed the patient's past medical history, surgical history, social history, family history, medications, and allergies as noted in the electronic medical record and as noted/entered.  I have reviewed the patient's review of systems as noted/enter and updated as noted in the patient's HPI.    RIGHT SHOULDER pain  20 years of pain right shoulder  Trouble overhead, trouble with descent, pain and weakness  [place] x15 years  [place] Xrays reviewed -- no acute bony findings  Chronic Oxycodone - prior neck and back fusion he notes  Multiple prior back and neck surgeries noted     MRSA with pain pump 2016 and subsequently removed     Dr. Jaswinder Hilario -- ID  Prior back injections \"flared up MRSA\"     Used to enjoy riding horse, squirrel hunting, raised goats and cows  Prior , fabricator and machining, broke horses, shoed     Limited over the years with multiple back surgeries and neck " "surgery  Baseline diminished sensation in his right upper extremity he notes, perceived weakness     52 y.o. male  Body mass index is 28.68 kg/m².      11/21/2023:  Right shoulder  Pain up to 20 years of shoulder pain  Pain with any movement of the joint  Feels worse-has been using a high-powered rifle for hunting  Disabled  Had a stroke 9/23/2023    MRI Shoulder Right Without Contrast    Result Date: 11/20/2023  Impression: Background rotator cuff tendinosis. Moderate to high-grade articular-sided tearing of the subscapularis tendon at and proximal to the footprint. There is some resultant medialization of the long head of biceps tendon in the intertubercular groove with evidence of interstitial tearing of the biceps tendon at the genu and intra-articular segment. Low-grade bursal-sided fraying and low-grade interstitial tearing of the supraspinatus tendon at the footprint. Mild fatty infiltration of the subscapularis muscle belly. Wispy intramuscular edema of the proximal anterior deltoid head, without architectural distortion of the fibers. This is compatible with low-grade muscle strain. Electronically Signed: Jayy Soria MD  11/20/2023 9:17 AM EST  Workstation ID: [id number]      I personally reviewed the MRI above.  He has partial tearing upper border subscapularis with long head of the biceps medial subluxation and suspect partial tearing of the long head of the biceps.  Partial tearing supraspinatus and infraspinatus.  Reported mild fatty infiltration of the subscapularis.  Some deltoid edema as well.    Stroke -- Bryn Cordero x5 days; now on aspirin and plavix -- affected LEFT side and \"completely paralyzed for 3 days\" and still numb he notes    MRSA history -- back      Subjective   Subjective      Review of Systems   Constitutional: Negative.  Negative for chills, fatigue and fever.   HENT: Negative.  Negative for congestion and dental problem.    Eyes: Negative.  Negative for blurred vision. "   Respiratory: Negative.  Negative for shortness of breath.    Cardiovascular: Negative.  Negative for leg swelling.   Gastrointestinal: Negative.  Negative for abdominal pain.   Endocrine: Negative.  Negative for polyuria.   Genitourinary: Negative.  Negative for difficulty urinating.   Musculoskeletal:  Positive for arthralgias.   Skin: Negative.    Allergic/Immunologic: Negative.    Neurological: Negative.    Hematological: Negative.  Negative for adenopathy.   Psychiatric/Behavioral: Negative.  Negative for behavioral problems.         Past Medical History:   Past Medical History:   Diagnosis Date    Arthritis     Hypertension     Low back pain     Pneumonia        Past Surgical History:   Past Surgical History:   Procedure Laterality Date    BACK SURGERY  05/07/2012    L3-4 Laminectomy, L5 Laminectomy, facetectomy, & Foraminotomy - Dr. Tee Sparks    NECK SURGERY  2011    NECK SURGERY  2012    Bryn Cordero    THORACIC FUSION  10/21/2014    T10-S1 Segmental Posterolateral Fusion - Dr. Luis Fernando Rothman       Family History:   Family History   Problem Relation Age of Onset    Heart disease Father     Cancer Brother        Social History:   Social History     Socioeconomic History    Marital status: Significant Other   Tobacco Use    Smoking status: Every Day     Packs/day: .5     Types: Cigarettes    Smokeless tobacco: Never   Vaping Use    Vaping Use: Never used   Substance and Sexual Activity    Alcohol use: Yes     Comment: Moderate    Drug use: No    Sexual activity: Defer       Medications:   Current Outpatient Medications:     aspirin 81 MG chewable tablet, Daily., Disp: , Rfl:     atorvastatin (LIPITOR) 40 MG tablet, 1 tablet., Disp: , Rfl:     Bactrim -160 MG per tablet, Take 1 tablet by mouth 2 (Two) Times a Day., Disp: , Rfl:     cetirizine (ZyrTEC) 10 MG chewable tablet, Every Night At Bedtime for Allergy Symptoms, Disp: , Rfl:     clopidogrel (PLAVIX) 75 MG tablet, 1 tablet., Disp: , Rfl:      "cyclobenzaprine (FLEXERIL) 10 MG tablet, Take 1 tablet by mouth As Needed., Disp: , Rfl:     naproxen sodium (ALEVE) 220 MG tablet, Take 1 tablet by mouth 2 (Two) Times a Day As Needed for Mild Pain., Disp: , Rfl:     oxyCODONE (ROXICODONE) 10 MG tablet, Take 1 tablet by mouth Every 6 (Six) Hours As Needed., Disp: , Rfl:     oxyCODONE-acetaminophen (Percocet)  MG per tablet, PERCOCET  MG TABS, Disp: , Rfl:     pantoprazole (PROTONIX) 40 MG EC tablet, 1 tablet., Disp: , Rfl:     tamsulosin (FLOMAX) 0.4 MG capsule 24 hr capsule, Take 1 capsule by mouth Daily., Disp: , Rfl:     Allergies:   Allergies   Allergen Reactions    Meloxicam     Neurontin [Gabapentin]        The following portions of the patient's history were reviewed and updated as appropriate: allergies, current medications, past family history, past medical history, past social history, past surgical history and problem list.        Objective    Objective      Vital Signs:   Vitals:    11/21/23 0936   BP: 122/80   Weight: 81.6 kg (180 lb)   Height: 177.8 cm (70\")       Ortho Exam:  RIGHT SHOULDER  Anterior biceps pain  Good rotator cuff strength noted including subscapularis testing despite partial tearing.  Overall stoic on clinical exam with relatively smooth arc of motion.  Using a cane.    Results Review:  Imaging Results (Last 24 Hours)       ** No results found for the last 24 hours. **            MRI Shoulder Right Without Contrast    Result Date: 11/20/2023  Impression: Background rotator cuff tendinosis. Moderate to high-grade articular-sided tearing of the subscapularis tendon at and proximal to the footprint. There is some resultant medialization of the long head of biceps tendon in the intertubercular groove with evidence of interstitial tearing of the biceps tendon at the genu and intra-articular segment. Low-grade bursal-sided fraying and low-grade interstitial tearing of the supraspinatus tendon at the footprint. Mild fatty " infiltration of the subscapularis muscle belly. Wispy intramuscular edema of the proximal anterior deltoid head, without architectural distortion of the fibers. This is compatible with low-grade muscle strain. Electronically Signed: Jayy Soria MD  11/20/2023 9:17 AM EST  Workstation ID: [id number]      I personally reviewed the MRI above.  He has partial tearing upper border subscapularis with long head of the biceps medial subluxation and suspect partial tearing of the long head of the biceps.  Partial tearing supraspinatus and infraspinatus.  Reported mild fatty infiltration of the subscapularis.  Some deltoid edema as well.    Procedures    Desires to avoid injection which is reasonable to avoid given his MRSA history he notes        Assessment / Plan      Assessment/Plan:   Problem List Items Addressed This Visit          Musculoskeletal and Injuries    Bursitis of right shoulder    Relevant Orders    Ambulatory Referral to Physical Therapy Evaluate and treat, Ortho (Completed)    Impingement syndrome of right shoulder    Relevant Orders    Ambulatory Referral to Physical Therapy Evaluate and treat, Ortho (Completed)    Biceps tendinitis of right upper extremity    Relevant Orders    Ambulatory Referral to Physical Therapy Evaluate and treat, Ortho (Completed)    Subluxation of tendon of long head of biceps    Relevant Orders    Ambulatory Referral to Physical Therapy Evaluate and treat, Ortho (Completed)    Nontraumatic incomplete tear of right rotator cuff - Primary    Relevant Orders    Ambulatory Referral to Physical Therapy Evaluate and treat, Ortho (Completed)       Other    Rotator cuff injury, right, initial encounter    Relevant Orders    Ambulatory Referral to Physical Therapy Evaluate and treat, Ortho (Completed)       RIGHT SHOULDER  Counseled on operative vs nonoperative measures  Desires to avoid injection with back MRSA history -- serious MRSA    Medical comorbidities including MRSA chronic  back pain chronic opioid use, recent stroke would make any surgery much higher risk.  Patient desires to avoid surgery and I agree.    Follow Up: 3 months      Osvaldo Lo MD, FAAOS  Orthopedic Surgeon  Fellowship Trained Shoulder and Elbow Surgeon  [facility]  Orthopedics and Sports Medicine  [address] [place] [address], [address]  [place] [address]    11/21/23  09:58 EST

## 2023-12-04 ENCOUNTER — TELEPHONE (OUTPATIENT)
Dept: ORTHOPEDIC SURGERY | Facility: CLINIC | Age: 53
End: 2023-12-04
Payer: MEDICARE

## 2023-12-04 DIAGNOSIS — S46.001A ROTATOR CUFF INJURY, RIGHT, INITIAL ENCOUNTER: ICD-10-CM

## 2023-12-04 DIAGNOSIS — M75.111 NONTRAUMATIC INCOMPLETE TEAR OF RIGHT ROTATOR CUFF: Primary | ICD-10-CM

## 2024-02-01 ENCOUNTER — TRANSCRIBE ORDERS (OUTPATIENT)
Dept: LAB | Facility: HOSPITAL | Age: 54
End: 2024-02-01
Payer: MEDICARE

## 2024-02-01 ENCOUNTER — LAB (OUTPATIENT)
Dept: LAB | Facility: HOSPITAL | Age: 54
End: 2024-02-01
Payer: MEDICARE

## 2024-02-01 DIAGNOSIS — A49.02 MRSA INFECTION: ICD-10-CM

## 2024-02-01 DIAGNOSIS — A49.02 MRSA INFECTION: Primary | ICD-10-CM

## 2024-02-01 DIAGNOSIS — L03.312 CELLULITIS OF BACK: ICD-10-CM

## 2024-02-01 DIAGNOSIS — M46.20 VERTEBRAL OSTEOMYELITIS: ICD-10-CM

## 2024-02-01 DIAGNOSIS — Z86.14 HX MRSA INFECTION: ICD-10-CM

## 2024-02-01 LAB
ALBUMIN SERPL-MCNC: 4.2 G/DL (ref 3.5–5.2)
ALBUMIN/GLOB SERPL: 1.1 G/DL
ALP SERPL-CCNC: 136 U/L (ref 39–117)
ALT SERPL W P-5'-P-CCNC: 15 U/L (ref 1–41)
ANION GAP SERPL CALCULATED.3IONS-SCNC: 12 MMOL/L (ref 5–15)
AST SERPL-CCNC: 14 U/L (ref 1–40)
BASOPHILS # BLD AUTO: 0.1 10*3/MM3 (ref 0–0.2)
BASOPHILS NFR BLD AUTO: 1 % (ref 0–1.5)
BILIRUB SERPL-MCNC: 0.2 MG/DL (ref 0–1.2)
BUN SERPL-MCNC: 9 MG/DL (ref 6–20)
BUN/CREAT SERPL: 11.3 (ref 7–25)
CALCIUM SPEC-SCNC: 9.7 MG/DL (ref 8.6–10.5)
CHLORIDE SERPL-SCNC: 103 MMOL/L (ref 98–107)
CO2 SERPL-SCNC: 25 MMOL/L (ref 22–29)
CREAT SERPL-MCNC: 0.8 MG/DL (ref 0.76–1.27)
CRP SERPL-MCNC: 2.41 MG/DL (ref 0–0.5)
DEPRECATED RDW RBC AUTO: 47 FL (ref 37–54)
EGFRCR SERPLBLD CKD-EPI 2021: 104.5 ML/MIN/1.73
EOSINOPHIL # BLD AUTO: 0.42 10*3/MM3 (ref 0–0.4)
EOSINOPHIL NFR BLD AUTO: 4.1 % (ref 0.3–6.2)
ERYTHROCYTE [DISTWIDTH] IN BLOOD BY AUTOMATED COUNT: 15.4 % (ref 12.3–15.4)
ERYTHROCYTE [SEDIMENTATION RATE] IN BLOOD: 74 MM/HR (ref 0–20)
GLOBULIN UR ELPH-MCNC: 3.8 GM/DL
GLUCOSE SERPL-MCNC: 132 MG/DL (ref 65–99)
HCT VFR BLD AUTO: 42.1 % (ref 37.5–51)
HGB BLD-MCNC: 13.4 G/DL (ref 13–17.7)
IMM GRANULOCYTES # BLD AUTO: 0.06 10*3/MM3 (ref 0–0.05)
IMM GRANULOCYTES NFR BLD AUTO: 0.6 % (ref 0–0.5)
LYMPHOCYTES # BLD AUTO: 2.82 10*3/MM3 (ref 0.7–3.1)
LYMPHOCYTES NFR BLD AUTO: 27.8 % (ref 19.6–45.3)
MCH RBC QN AUTO: 26.5 PG (ref 26.6–33)
MCHC RBC AUTO-ENTMCNC: 31.8 G/DL (ref 31.5–35.7)
MCV RBC AUTO: 83.4 FL (ref 79–97)
MONOCYTES # BLD AUTO: 0.66 10*3/MM3 (ref 0.1–0.9)
MONOCYTES NFR BLD AUTO: 6.5 % (ref 5–12)
NEUTROPHILS NFR BLD AUTO: 6.1 10*3/MM3 (ref 1.7–7)
NEUTROPHILS NFR BLD AUTO: 60 % (ref 42.7–76)
NRBC BLD AUTO-RTO: 0 /100 WBC (ref 0–0.2)
PLATELET # BLD AUTO: 303 10*3/MM3 (ref 140–450)
PMV BLD AUTO: 10.7 FL (ref 6–12)
POTASSIUM SERPL-SCNC: 4 MMOL/L (ref 3.5–5.2)
PROT SERPL-MCNC: 8 G/DL (ref 6–8.5)
RBC # BLD AUTO: 5.05 10*6/MM3 (ref 4.14–5.8)
SODIUM SERPL-SCNC: 140 MMOL/L (ref 136–145)
WBC NRBC COR # BLD AUTO: 10.16 10*3/MM3 (ref 3.4–10.8)

## 2024-02-01 PROCEDURE — 80053 COMPREHEN METABOLIC PANEL: CPT

## 2024-02-01 PROCEDURE — 85652 RBC SED RATE AUTOMATED: CPT

## 2024-02-01 PROCEDURE — 86140 C-REACTIVE PROTEIN: CPT

## 2024-02-01 PROCEDURE — 85025 COMPLETE CBC W/AUTO DIFF WBC: CPT

## 2024-02-01 PROCEDURE — 36415 COLL VENOUS BLD VENIPUNCTURE: CPT

## 2024-08-01 ENCOUNTER — TRANSCRIBE ORDERS (OUTPATIENT)
Dept: LAB | Facility: HOSPITAL | Age: 54
End: 2024-08-01
Payer: MEDICARE

## 2024-08-01 ENCOUNTER — LAB (OUTPATIENT)
Dept: LAB | Facility: HOSPITAL | Age: 54
End: 2024-08-01
Payer: MEDICARE

## 2024-08-01 DIAGNOSIS — M46.20 VERTEBRAL OSTEOMYELITIS: ICD-10-CM

## 2024-08-01 DIAGNOSIS — A49.02 MRSA INFECTION: Primary | ICD-10-CM

## 2024-08-01 DIAGNOSIS — L03.312 CELLULITIS OF BACK: ICD-10-CM

## 2024-08-01 LAB
ALBUMIN SERPL-MCNC: 4.1 G/DL (ref 3.5–5.2)
ALBUMIN/GLOB SERPL: 1 G/DL
ALP SERPL-CCNC: 155 U/L (ref 39–117)
ALT SERPL W P-5'-P-CCNC: 13 U/L (ref 1–41)
ANION GAP SERPL CALCULATED.3IONS-SCNC: 9 MMOL/L (ref 5–15)
AST SERPL-CCNC: 15 U/L (ref 1–40)
BASOPHILS # BLD AUTO: 0.08 10*3/MM3 (ref 0–0.2)
BASOPHILS NFR BLD AUTO: 0.8 % (ref 0–1.5)
BILIRUB SERPL-MCNC: 0.2 MG/DL (ref 0–1.2)
BUN SERPL-MCNC: 9 MG/DL (ref 6–20)
BUN/CREAT SERPL: 9.4 (ref 7–25)
CALCIUM SPEC-SCNC: 9.7 MG/DL (ref 8.6–10.5)
CHLORIDE SERPL-SCNC: 101 MMOL/L (ref 98–107)
CO2 SERPL-SCNC: 26 MMOL/L (ref 22–29)
CREAT SERPL-MCNC: 0.96 MG/DL (ref 0.76–1.27)
CRP SERPL-MCNC: 2.38 MG/DL (ref 0–0.5)
DEPRECATED RDW RBC AUTO: 45.5 FL (ref 37–54)
EGFRCR SERPLBLD CKD-EPI 2021: 93.3 ML/MIN/1.73
EOSINOPHIL # BLD AUTO: 0.28 10*3/MM3 (ref 0–0.4)
EOSINOPHIL NFR BLD AUTO: 2.7 % (ref 0.3–6.2)
ERYTHROCYTE [DISTWIDTH] IN BLOOD BY AUTOMATED COUNT: 15.1 % (ref 12.3–15.4)
ERYTHROCYTE [SEDIMENTATION RATE] IN BLOOD: 68 MM/HR (ref 0–20)
GLOBULIN UR ELPH-MCNC: 4.3 GM/DL
GLUCOSE SERPL-MCNC: 137 MG/DL (ref 65–99)
HCT VFR BLD AUTO: 41.1 % (ref 37.5–51)
HGB BLD-MCNC: 12.9 G/DL (ref 13–17.7)
IMM GRANULOCYTES # BLD AUTO: 0.05 10*3/MM3 (ref 0–0.05)
IMM GRANULOCYTES NFR BLD AUTO: 0.5 % (ref 0–0.5)
LYMPHOCYTES # BLD AUTO: 3.52 10*3/MM3 (ref 0.7–3.1)
LYMPHOCYTES NFR BLD AUTO: 33.4 % (ref 19.6–45.3)
MCH RBC QN AUTO: 26.1 PG (ref 26.6–33)
MCHC RBC AUTO-ENTMCNC: 31.4 G/DL (ref 31.5–35.7)
MCV RBC AUTO: 83.2 FL (ref 79–97)
MONOCYTES # BLD AUTO: 0.69 10*3/MM3 (ref 0.1–0.9)
MONOCYTES NFR BLD AUTO: 6.6 % (ref 5–12)
NEUTROPHILS NFR BLD AUTO: 5.91 10*3/MM3 (ref 1.7–7)
NEUTROPHILS NFR BLD AUTO: 56 % (ref 42.7–76)
NRBC BLD AUTO-RTO: 0 /100 WBC (ref 0–0.2)
PLATELET # BLD AUTO: 287 10*3/MM3 (ref 140–450)
PMV BLD AUTO: 10.3 FL (ref 6–12)
POTASSIUM SERPL-SCNC: 4 MMOL/L (ref 3.5–5.2)
PROT SERPL-MCNC: 8.4 G/DL (ref 6–8.5)
RBC # BLD AUTO: 4.94 10*6/MM3 (ref 4.14–5.8)
SODIUM SERPL-SCNC: 136 MMOL/L (ref 136–145)
WBC NRBC COR # BLD AUTO: 10.53 10*3/MM3 (ref 3.4–10.8)

## 2024-08-01 PROCEDURE — 85025 COMPLETE CBC W/AUTO DIFF WBC: CPT | Performed by: INTERNAL MEDICINE

## 2024-08-01 PROCEDURE — 86140 C-REACTIVE PROTEIN: CPT | Performed by: INTERNAL MEDICINE

## 2024-08-01 PROCEDURE — 85652 RBC SED RATE AUTOMATED: CPT | Performed by: INTERNAL MEDICINE

## 2024-08-01 PROCEDURE — 80053 COMPREHEN METABOLIC PANEL: CPT | Performed by: INTERNAL MEDICINE

## 2024-08-01 PROCEDURE — 36415 COLL VENOUS BLD VENIPUNCTURE: CPT | Performed by: INTERNAL MEDICINE

## 2024-12-13 ENCOUNTER — TELEPHONE (OUTPATIENT)
Dept: GASTROENTEROLOGY | Facility: CLINIC | Age: 54
End: 2024-12-13
Payer: MEDICARE

## 2024-12-13 NOTE — TELEPHONE ENCOUNTER
PT IS ON THE SCHEDULE FOR A COLON ON 2/7/25 WITH DR EID AND IS ON PLAVIX MANAGED BY PCP NICHOLAS SARAH.    P# [phone]  F# [phone]

## 2025-01-07 NOTE — TELEPHONE ENCOUNTER
January 7, 2025 [address] [place] [address]  Phone: [phone]  Fax: [phone]                 Vadim Head [facility] [address]  1970        Patient will be having a a colonoscopy by Dr. Bright Modi on February 7, 2025. The patient is needing cardiac clearance due to being on blood thinners. Please complete the following and fax back to the office.     Patient's was last seen in the office on:_____________________    Procedure/Test Performed:    _____ Stress Test       _____ Echocardiogram    _____ EKG     _____ Heart Cath    Based on the above test results and/or clinical evaluation it is my recommendation:    ____ Patient may proceed with the scheduled surgical procedure with an acceptable cardiac risk.    ____ Patient CAN NOT stop Plavix, Effient, Ticlid, Aspirin, Coumadin, Pradaxa, Xarelto, Eliquis, Savaysa, or Brilinta prior to procedure.     ____ Patient CAN stop Plavix, Effient, Ticlid, Aspirin, Coumadin, Pradaxa, Xarelto, Eliquis, Savaysa, or Brilinta  ______ days prior to procedure.    Please sign and date below.    Signature________________________________________   Date:_________________     Thank You    Mark I. Brunner, M.D.  MUMTAZ Washburn M.D. V. Alex Howard, M.D. Amy C. Tiu, M.D. Gregory M. Woolfolk, M.D. Laura Travis, APRN Stephanie Miller, PA Kyle Bloomfield, APRN Katie Cecil, PA

## 2025-01-07 NOTE — TELEPHONE ENCOUNTER
Spoke with ERIKA Schulz and she gave a verbal for pt to stop Plavix 3 days prior to colonoscopy. Pt will be notified, Called pt no answer and no voicemail available.

## 2025-01-28 ENCOUNTER — LAB (OUTPATIENT)
Dept: LAB | Facility: HOSPITAL | Age: 55
End: 2025-01-28
Payer: MEDICARE

## 2025-01-28 ENCOUNTER — TRANSCRIBE ORDERS (OUTPATIENT)
Dept: LAB | Facility: HOSPITAL | Age: 55
End: 2025-01-28
Payer: MEDICARE

## 2025-01-28 DIAGNOSIS — M46.20 SPINAL ABSCESS: ICD-10-CM

## 2025-01-28 DIAGNOSIS — A49.02 INFECTION WITH METHICILLIN-RESISTANT STAPHYLOCOCCUS AUREUS (MRSA): ICD-10-CM

## 2025-01-28 DIAGNOSIS — L02.212 ABSCESS OF BACK, EXCEPT BUTTOCK: ICD-10-CM

## 2025-01-28 DIAGNOSIS — A49.02 INFECTION WITH METHICILLIN-RESISTANT STAPHYLOCOCCUS AUREUS (MRSA): Primary | ICD-10-CM

## 2025-01-28 DIAGNOSIS — L03.312 CELLULITIS OF BACK: ICD-10-CM

## 2025-01-28 LAB
ALBUMIN SERPL-MCNC: 4.3 G/DL (ref 3.5–5.2)
ALBUMIN/GLOB SERPL: 1.1 G/DL
ALP SERPL-CCNC: 138 U/L (ref 39–117)
ALT SERPL W P-5'-P-CCNC: 13 U/L (ref 1–41)
ANION GAP SERPL CALCULATED.3IONS-SCNC: 10 MMOL/L (ref 5–15)
AST SERPL-CCNC: 17 U/L (ref 1–40)
BASOPHILS # BLD AUTO: 0.09 10*3/MM3 (ref 0–0.2)
BASOPHILS NFR BLD AUTO: 1 % (ref 0–1.5)
BILIRUB SERPL-MCNC: 0.3 MG/DL (ref 0–1.2)
BUN SERPL-MCNC: 8 MG/DL (ref 6–20)
BUN/CREAT SERPL: 9.9 (ref 7–25)
CALCIUM SPEC-SCNC: 9.8 MG/DL (ref 8.6–10.5)
CHLORIDE SERPL-SCNC: 95 MMOL/L (ref 98–107)
CO2 SERPL-SCNC: 28 MMOL/L (ref 22–29)
CREAT SERPL-MCNC: 0.81 MG/DL (ref 0.76–1.27)
CRP SERPL-MCNC: 2.66 MG/DL (ref 0–0.5)
DEPRECATED RDW RBC AUTO: 46.3 FL (ref 37–54)
EGFRCR SERPLBLD CKD-EPI 2021: 103.5 ML/MIN/1.73
EOSINOPHIL # BLD AUTO: 0.04 10*3/MM3 (ref 0–0.4)
EOSINOPHIL NFR BLD AUTO: 0.4 % (ref 0.3–6.2)
ERYTHROCYTE [DISTWIDTH] IN BLOOD BY AUTOMATED COUNT: 16.2 % (ref 12.3–15.4)
ERYTHROCYTE [SEDIMENTATION RATE] IN BLOOD: 103 MM/HR (ref 0–20)
GLOBULIN UR ELPH-MCNC: 3.9 GM/DL
GLUCOSE SERPL-MCNC: 105 MG/DL (ref 65–99)
HCT VFR BLD AUTO: 39.8 % (ref 37.5–51)
HGB BLD-MCNC: 12.4 G/DL (ref 13–17.7)
IMM GRANULOCYTES # BLD AUTO: 0.04 10*3/MM3 (ref 0–0.05)
IMM GRANULOCYTES NFR BLD AUTO: 0.4 % (ref 0–0.5)
LYMPHOCYTES # BLD AUTO: 3.32 10*3/MM3 (ref 0.7–3.1)
LYMPHOCYTES NFR BLD AUTO: 35.8 % (ref 19.6–45.3)
MCH RBC QN AUTO: 24.8 PG (ref 26.6–33)
MCHC RBC AUTO-ENTMCNC: 31.2 G/DL (ref 31.5–35.7)
MCV RBC AUTO: 79.8 FL (ref 79–97)
MONOCYTES # BLD AUTO: 0.7 10*3/MM3 (ref 0.1–0.9)
MONOCYTES NFR BLD AUTO: 7.6 % (ref 5–12)
NEUTROPHILS NFR BLD AUTO: 5.08 10*3/MM3 (ref 1.7–7)
NEUTROPHILS NFR BLD AUTO: 54.8 % (ref 42.7–76)
NRBC BLD AUTO-RTO: 0 /100 WBC (ref 0–0.2)
PLATELET # BLD AUTO: 311 10*3/MM3 (ref 140–450)
PMV BLD AUTO: 9.9 FL (ref 6–12)
POTASSIUM SERPL-SCNC: 4.1 MMOL/L (ref 3.5–5.2)
PROT SERPL-MCNC: 8.2 G/DL (ref 6–8.5)
RBC # BLD AUTO: 4.99 10*6/MM3 (ref 4.14–5.8)
SODIUM SERPL-SCNC: 133 MMOL/L (ref 136–145)
WBC NRBC COR # BLD AUTO: 9.27 10*3/MM3 (ref 3.4–10.8)

## 2025-01-28 PROCEDURE — 85652 RBC SED RATE AUTOMATED: CPT

## 2025-01-28 PROCEDURE — 36415 COLL VENOUS BLD VENIPUNCTURE: CPT

## 2025-01-28 PROCEDURE — 85025 COMPLETE CBC W/AUTO DIFF WBC: CPT

## 2025-01-28 PROCEDURE — 80053 COMPREHEN METABOLIC PANEL: CPT

## 2025-01-28 PROCEDURE — 86140 C-REACTIVE PROTEIN: CPT

## 2025-02-07 ENCOUNTER — OUTSIDE FACILITY SERVICE (OUTPATIENT)
Dept: GASTROENTEROLOGY | Facility: CLINIC | Age: 55
End: 2025-02-07
Payer: MEDICARE

## 2025-02-07 PROCEDURE — 45388 COLONOSCOPY W/ABLATION: CPT | Performed by: INTERNAL MEDICINE

## 2025-02-07 PROCEDURE — 45385 COLONOSCOPY W/LESION REMOVAL: CPT | Performed by: INTERNAL MEDICINE

## 2025-02-07 PROCEDURE — 45390 COLONOSCOPY W/RESECTION: CPT | Performed by: INTERNAL MEDICINE

## 2025-02-07 PROCEDURE — 88305 TISSUE EXAM BY PATHOLOGIST: CPT | Performed by: INTERNAL MEDICINE

## 2025-02-10 ENCOUNTER — LAB REQUISITION (OUTPATIENT)
Dept: LAB | Facility: HOSPITAL | Age: 55
End: 2025-02-10
Payer: MEDICARE

## 2025-02-10 DIAGNOSIS — D12.7 BENIGN NEOPLASM OF RECTOSIGMOID JUNCTION: ICD-10-CM

## 2025-02-10 DIAGNOSIS — K64.8 OTHER HEMORRHOIDS: ICD-10-CM

## 2025-02-10 DIAGNOSIS — D12.2 BENIGN NEOPLASM OF ASCENDING COLON: ICD-10-CM

## 2025-02-10 DIAGNOSIS — D12.8 BENIGN NEOPLASM OF RECTUM: ICD-10-CM

## 2025-02-10 DIAGNOSIS — Z12.11 ENCOUNTER FOR SCREENING FOR MALIGNANT NEOPLASM OF COLON: ICD-10-CM

## 2025-02-11 LAB
CYTO UR: NORMAL
LAB AP CASE REPORT: NORMAL
LAB AP CLINICAL INFORMATION: NORMAL
PATH REPORT.FINAL DX SPEC: NORMAL
PATH REPORT.GROSS SPEC: NORMAL

## 2025-03-11 ENCOUNTER — TELEPHONE (OUTPATIENT)
Dept: GASTROENTEROLOGY | Facility: CLINIC | Age: 55
End: 2025-03-11
Payer: MEDICARE

## 2025-03-11 NOTE — TELEPHONE ENCOUNTER
DR EID PATIENT HAVING 6 MONTH COLON RECALL 8/11, TAKES BLOOD THINNER PLAVIX, AND NEEDS CARDIAC CLEARANCE. PRESCRIBED AND MANAGED BY HIS PCP, LOIS RUBIN, PHONE [phone], [address] [place] [address]

## 2025-08-18 ENCOUNTER — TELEPHONE (OUTPATIENT)
Dept: GASTROENTEROLOGY | Facility: CLINIC | Age: 55
End: 2025-08-18
Payer: MEDICARE